# Patient Record
Sex: FEMALE | Race: WHITE | NOT HISPANIC OR LATINO | Employment: FULL TIME | URBAN - METROPOLITAN AREA
[De-identification: names, ages, dates, MRNs, and addresses within clinical notes are randomized per-mention and may not be internally consistent; named-entity substitution may affect disease eponyms.]

---

## 2017-06-07 ENCOUNTER — THERAPY VISIT (OUTPATIENT)
Dept: CHIROPRACTIC MEDICINE | Facility: CLINIC | Age: 42
End: 2017-06-07
Payer: COMMERCIAL

## 2017-06-07 DIAGNOSIS — G89.29 CHRONIC RIGHT-SIDED LOW BACK PAIN WITHOUT SCIATICA: Primary | ICD-10-CM

## 2017-06-07 DIAGNOSIS — M99.04 SOMATIC DYSFUNCTION OF SACRAL REGION: ICD-10-CM

## 2017-06-07 DIAGNOSIS — R29.3 POOR POSTURE: ICD-10-CM

## 2017-06-07 DIAGNOSIS — M53.3 SACRAL PAIN: ICD-10-CM

## 2017-06-07 DIAGNOSIS — M54.50 CHRONIC RIGHT-SIDED LOW BACK PAIN WITHOUT SCIATICA: Primary | ICD-10-CM

## 2017-06-07 DIAGNOSIS — M54.2 CERVICALGIA: ICD-10-CM

## 2017-06-07 DIAGNOSIS — M99.02 THORACIC SEGMENT DYSFUNCTION: ICD-10-CM

## 2017-06-07 PROCEDURE — 97112 NEUROMUSCULAR REEDUCATION: CPT | Mod: 59 | Performed by: CHIROPRACTOR

## 2017-06-07 PROCEDURE — 99203 OFFICE O/P NEW LOW 30 MIN: CPT | Mod: 25 | Performed by: CHIROPRACTOR

## 2017-06-07 PROCEDURE — 98941 CHIROPRACT MANJ 3-4 REGIONS: CPT | Mod: AT | Performed by: CHIROPRACTOR

## 2017-06-07 NOTE — MR AVS SNAPSHOT
After Visit Summary   6/7/2017    Becca Garcia    MRN: [id number]           Patient Information     Date Of Birth          1975        Visit Information        Provider Department      6/7/2017 2:45 PM Kiera Griffith DC [facility] [place] Chiropractor        Today's Diagnoses     Chronic right-sided low back pain without sciatica    -  1    Sacral pain        Somatic dysfunction of sacral region        Thoracic segment dysfunction        Poor posture        Cervicalgia           Follow-ups after your visit        Your next 10 appointments already scheduled     Jun 14, 2017  3:00 PM CDT   [facility] Chiropractor with Kiera Griffith DC   [facility] [place] Chiropractor ([facility] [place])    [address] [place] [address]  [phone]  [place] [address]   [phone]            Jun 21, 2017  3:00 PM CDT   LA[facility]A Chiropractor with Kiera Griffith DC   [facility] [place] Chiropractor ([facility] [place])    [address] [place] [address]  [phone]  [place] [address]   [phone]              Who to contact     If you have questions or need follow up information about today's clinic visit or your schedule please contact [facility] directly at [phone].  Normal or non-critical lab and imaging results will be communicated to you by [employer], letter or phone within 4 business days after the clinic has received the results. If you do not hear from us within 7 days, please contact the clinic through [employer] or phone. If you have a critical or abnormal lab result, we will notify you by phone as soon as possible.  Submit refill requests through [employer] or call your pharmacy and they will forward the refill request to us. Please allow 3 business days for your refill to be completed.          Additional Information About Your Visit        [employer] Information     [employer] lets you send  "messages to your doctor, view your test results, renew your prescriptions, schedule appointments and more. To sign up, go to [email or web address] . Click on \"Log in\" on the left side of the screen, which will take you to the Welcome page. Then click on \"Sign up Now\" on the right side of the page.     You will be asked to enter the access code listed below, as well as some personal information. Please follow the directions to create your username and password.     Your access code is: [email or web address]  Expires: 2017  2:05 PM     Your access code will  in 90 days. If you need help or a new code, please call your [place] clinic or [phone].        Care EveryWhere ID     This is your Care EveryWhere ID. This could be used by other organizations to access your [place] medical records  [id number]         Blood Pressure from Last 3 Encounters:   16 102/62   09/15/12 120/80   09/15/11 133/79    Weight from Last 3 Encounters:   16 68 kg (150 lb)              We Performed the Following     CHIROPRAC MANIP,SPINAL,3-4 REGIONS     NEUROMUSCULAR RE-EDUCATION     OFFICE/OUTPT VISIT,NIKKO KIDD III        Primary Care Provider Office Phone # Fax #    Jennifer Han [phone]       [facility] [address] [place] [address]        Thank you!     Thank you for choosing [place] [facility]  for your care. Our goal is always to provide you with excellent care. Hearing back from our patients is one way we can continue to improve our services. Please take a few minutes to complete the written survey that you may receive in the mail after your visit with us. Thank you!             Your Updated Medication List - Protect others around you: Learn how to safely use, store and throw away your medicines at www.disposemymeds.org.          This list is accurate as of: 17 11:59 PM.  Always use your most recent med list.                "    Brand Name Dispense Instructions for use    NO ACTIVE MEDICATIONS

## 2017-06-09 PROBLEM — M99.02 THORACIC SEGMENT DYSFUNCTION: Status: ACTIVE | Noted: 2017-06-09

## 2017-06-09 PROBLEM — G89.29 CHRONIC RIGHT-SIDED LOW BACK PAIN WITHOUT SCIATICA: Status: ACTIVE | Noted: 2017-06-09

## 2017-06-09 PROBLEM — M99.04 SOMATIC DYSFUNCTION OF SACRAL REGION: Status: ACTIVE | Noted: 2017-06-09

## 2017-06-09 PROBLEM — M53.3 SACRAL PAIN: Status: ACTIVE | Noted: 2017-06-09

## 2017-06-09 PROBLEM — M54.50 CHRONIC RIGHT-SIDED LOW BACK PAIN WITHOUT SCIATICA: Status: ACTIVE | Noted: 2017-06-09

## 2017-06-09 PROBLEM — R29.3 POOR POSTURE: Status: ACTIVE | Noted: 2017-06-09

## 2017-06-09 NOTE — PROGRESS NOTES
Chiropractic Clinic Visit    PCP: Jennifer Han YULI Garcia is a 42 year old female who is seen  as a self referral presenting with chronic low back pain/R SI pain, B neck pain . Patient reports that the onset was ongoing for several years.  When asked, patient denies:, falling, slipping, bending and reaching or sleeping awkwardly. The pt relates the px to several years of activity however there was no specific incident that could have started the px. She notes B neck and shoulder pain with no HA that she relates to stress, in addition to constnat R sided low back pain. Pain is graded a 2-5/10 on VAS. She reports a constant dull ache at a 1/10 on VAS. Sitting will increase the px.  The pt denies weakness in the extremities or other unusual sx.  Prior to onset, the patient was able to sit for 8 hours. Patient notes that due to symptoms, they can only sit for a short period. Becca Garcia notes   sitting rated at a 5/10 painful, difficult and prior to this incident it was 0/10.        Injury: There was no specific injury associated with this episode    Location of Pain: bilateral cervical and R SI pain  at the following level(s) C2 , C3 , C7 , T1 , T9 , L5 , Sacrum  and PSIS Right   Duration of Pain: over 10 years   Rating of Pain at worst: 5/10  Rating of Pain Currently: 3/10  Symptoms are better with: Nothing  Symptoms are worse with: sitting  Additional Features: none      Other evaluation and/or treatments so far consists of: Nothing/previous Chiropractic with good results however temporary    Health History  as reported by the patient:    How does the patient rate their own health:   Excellent    Current or past medical history:   No red flags identified    Medical allergies  None    Past Traumas/Surgeries  None    Family History  Family History   Problem Relation Age of Onset     Other Cancer Father      Breast Cancer Paternal Aunt      2 paternal aunts.        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    Past Medical History:   Diagnosis Date     Cyst of ovary      Past Surgical History:   Procedure Laterality Date     cryotherapy      for cervical dysplasia       Objective  There were no vitals taken for this visit.    GENERAL APPEARANCE: healthy, alert and no distress   GAIT: NORMAL  SKIN: no suspicious lesions or rashes  NEURO: Normal strength and tone, mentation intact and speech normal  PSYCH:  mentation appears normal and affect normal/bright      Becca was asked to complete the Neck Disability Index, the Oswestry Low Back Disability Index and Katia Start Back screening tool, today in the office. NDI Disability score: 14%; pain severity scale: 5/10., The Oswestry Disability score: 8%. Keel Start Total Score:5 Sub Score: 0       Cervical Spine Exam    Range of Motion:         Full active and passive ROM forward flexion, extension,   Decreased lateral rotation, lateral flexion with pain at end range     Inspection:         No visible deformity        normal lordotic curvature maintained  Poor seated posture, slumped, anterior head carriage    Tender:        upper border of trapezius       B suboccipitals, R levator scapula     Non-Tender:        remainder of cervical spine area    Strength:       C4 (shoulder shrug)  symmetric 5/5       C5 (shoulder abduction) symmetric 5/5       C6 (elbow flexion) symmetric 5/5       C7 (elbow extension) symmetric 5/5       C8 (finger abduction, thumb flexion) symmetric 5/5    Reflexes:        C5 (biceps) symmetric normal       C6 (supinator) symmetric normal       C7 (triceps) symmetric  "normal    Sensation:       grossly intact througout bilateral upper extremities    Special Tests:       positive (+) Spurling  Naun's- positive, VBI- negative and Duncan Staton - negative    Lymphatics:        no edema noted in the upper extremities       Lumbar exam:    Inspection:  \"     no visible deformity in the low back       normal skin\"  Poor seated posture, slumped, anterior head carriage    ROM:       full flexion       limited extension due to pain    Tender:       paraspinal muscles       B QL, R gluteus medius    Non Tender:       remainder of lumbar spine    Strength:       hip flexion 5/5       knee extension 5/5       ankle dorsiflexion 5/5       ankle plantarflexion 5/5       dorsiflexion of the great toe 5/5    Reflexes:       patellar (L3, L4) symmetric normal       achilles tendons (S1) symmetric normal    Sensation:      grossly intact throughout lower extremities    Special tests:  Kemps - Right positive, low back pain and Left negative, SLR - Right negative and Left negative, Gaenslen's - Right negative and Left negative and Fabere - Right positive, hip and low back pain and Left negative    Segmental spinal dysfunction/restrictions found at:C2 , C3 , C7 , T1 , T9 , L5 , Sacrum  and PSIS Right   .    The following soft tissue hypotonicities were observed:Gluteal: right, referred pain: no  Quad lumb: bilateral, referred pain: no  Lev scapulae: ache and dull pain, referred pain: no  Sub-occiput: ache and dull pain, referred pain: no    Trigger points were found in:none     Muscle spasm found in:Gluteal, Levator scapulae, Lumbar erector spine, Sub-occipital and Traps      Radiology:  None     Assessment:    1. Chronic right-sided low back pain without sciatica    2. Sacral pain    3. Somatic dysfunction of sacral region    4. Thoracic segment dysfunction    5. Poor posture    6. Cervicalgia        RX ordered/plan of care  Anticipated outcomes  Possible risks and side effects    After discussing " the risk and benefits of care, patient consented to treatment    Prognosis: Excellent      Patient's condition:  Patient had restrictions pre-manipulation    Treatment effectiveness:  Post manipulation there is better intersegmental movement and Patient claims to feel looser post manipulation      Plan:    Procedures:  Evaluation and Management  92050 Moderate level exam 30 min    CMT:  32218 Chiropractic manipulative treatment 3-4 regions performed   Thoracic: Diversified, T1, T6, Prone  Lumbar: Diversified, L5, Side posture  Pelvis: Drop Table, Sacrum , PSIS Right , Prone    Modalities:  None performed this visit    Therapeutic procedures:  31838: Neurological re-education/proprioception training and proper long term sitting posture: Corrected patient's seated posture when sitting for longer than 20 minutes or seated at the computer related to work duties for over 8 hours per day. Fit patient with Cheryl lumbar support for postural re-training. Showed patient how to place the support correctly when seated and to increase usage by 2-3 hour increments per day until they are able to sit full time without spinal irritation with demonstration. Related improper vs. proper sitting to spinal biomechanics using the spine model with demonstration with the purpose of PREVENTING premature spinal degeneration from cumulative static motion. Demonstrated the increase in load and shearing forces on the spine in addition to the cumulative degenerative effects of axial compression on a spine that is chronically slumped and in an 'unlocked' position vs a 'locked' position. Gave cervical retraction for proper cervical alignment, anterior deep cervical flexor strengthening and proprioception training with demonstration. Per 10 minutes total        Response to Treatment  Reduction in symptoms as reported by patient    Treatment plan and goals:  Goals:  Sitting 2 hours without low back pain    Frequency of care  Duration of care is  estimated to be 4-6 weeks, from the initial treatment.  It is estimated that the patient will need a total of 4-6 visits to resolve this episode.  For the initial therapeutic trial of care, the frequency is recommended at 1 X week, once daily.  A reevaluation would be clinically appropriate in 4-6 visits, to determine progress and further course of care.    In-Office Treatment  Evaluation  Spinal Chiropractic Manipulative Therapy  Postural correction  US   ACP discussion              Recommendations:    Instructions:Use lumbar support at all times when seated     Follow-up:  Return to care in 1 week with US therapy  ACP discussion.       Disclaimer: This note consists of symbols derived from keyboarding, dictation and/or voice recognition software. As a result, there may be errors in the script that have gone undetected. Please consider this when interpreting information found in this chart.

## 2017-06-14 ENCOUNTER — THERAPY VISIT (OUTPATIENT)
Dept: CHIROPRACTIC MEDICINE | Facility: CLINIC | Age: 42
End: 2017-06-14
Payer: COMMERCIAL

## 2017-06-14 DIAGNOSIS — M53.3 SACRAL PAIN: ICD-10-CM

## 2017-06-14 DIAGNOSIS — M99.02 THORACIC SEGMENT DYSFUNCTION: ICD-10-CM

## 2017-06-14 DIAGNOSIS — M54.2 CERVICALGIA: ICD-10-CM

## 2017-06-14 DIAGNOSIS — G89.29 CHRONIC RIGHT-SIDED LOW BACK PAIN WITHOUT SCIATICA: Primary | ICD-10-CM

## 2017-06-14 DIAGNOSIS — M99.04 SOMATIC DYSFUNCTION OF SACRAL REGION: ICD-10-CM

## 2017-06-14 DIAGNOSIS — M54.50 CHRONIC RIGHT-SIDED LOW BACK PAIN WITHOUT SCIATICA: Primary | ICD-10-CM

## 2017-06-14 PROCEDURE — 97110 THERAPEUTIC EXERCISES: CPT | Performed by: CHIROPRACTOR

## 2017-06-14 PROCEDURE — 98941 CHIROPRACT MANJ 3-4 REGIONS: CPT | Mod: AT | Performed by: CHIROPRACTOR

## 2017-06-14 PROCEDURE — 97035 APP MDLTY 1+ULTRASOUND EA 15: CPT | Performed by: CHIROPRACTOR

## 2017-06-14 NOTE — MR AVS SNAPSHOT
"              After Visit Summary   6/14/2017    Becca Garcia    MRN: [id number]           Patient Information     Date Of Birth          1975        Visit Information        Provider Department      6/14/2017 3:00 PM Kiera Griffith DC [facility] [place] Chi[facility]        Today's Diagnoses     Chronic right-sided low back pain without sciatica    -  1    Sacral pain        Somatic dysfunction of sacral region        Thoracic segment dysfunction        Cervicalgia           Follow-ups after your visit        Your next 10 appointments already scheduled     Jun 21, 2017  3:00 PM CDT   [facility] Chiropractor with Kiera Griffith DC   [facility] [place] Chiropractor ([facility] [place])    [address] [place] [address]  [address]  [place] [address] [phone]              Who to contact     If you have questions or need follow up information about today's clinic visit or your schedule please contact [facility] [place] directly at [phone].  Normal or non-critical lab and imaging results will be communicated to you by [employer], letter or phone within 4 business days after the clinic has received the results. If you do not hear from us within 7 days, please contact the clinic through [employer] or phone. If you have a critical or abnormal lab result, we will notify you by phone as soon as possible.  Submit refill requests through [employer] or call your pharmacy and they will forward the refill request to us. Please allow 3 business days for your refill to be completed.          Additional Information About Your Visit        MyChart Information     [employer] lets you send messages to your doctor, view your test results, renew your prescriptions, schedule appointments and more. To sign up, go to [email or web address] . Click on \"Log in\" on the left side of the screen, which will take you to the Welcome page. Then click " "on \"Sign up Now\" on the right side of the page.     You will be asked to enter the access code listed below, as well as some personal information. Please follow the directions to create your username and password.     Your access code is: [email or web address]  Expires: 2017  2:05 PM     Your access code will  in 90 days. If you need help or a new code, please call your [place] clinic or [phone].        Care EveryWhere ID     This is your Care EveryWhere ID. This could be used by other organizations to access your [place] medical records  [id number]         Blood Pressure from Last 3 Encounters:   16 102/62   09/15/12 120/80   09/15/11 133/79    Weight from Last 3 Encounters:   16 68 kg (150 lb)              We Performed the Following     CHIROPRAC MANIP,SPINAL,3-4 REGIONS     THERAPEUTIC EXERCISES     ULTRASOUND THERAPY        Primary Care Provider Office Phone # Fax #    Jennifer Han [phone]       [facility] [address] [place] [address]        Thank you!     Thank you for choosing [facility]  for your care. Our goal is always to provide you with excellent care. Hearing back from our patients is one way we can continue to improve our services. Please take a few minutes to complete the written survey that you may receive in the mail after your visit with us. Thank you!             Your Updated Medication List - Protect others around you: Learn how to safely use, store and throw away your medicines at www.disposemymeds.org.          This list is accurate as of: 17 11:59 PM.  Always use your most recent med list.                   Brand Name Dispense Instructions for use    NO ACTIVE MEDICATIONS            "

## 2017-06-15 PROBLEM — R29.3 POOR POSTURE: Status: RESOLVED | Noted: 2017-06-09 | Resolved: 2017-06-15

## 2017-06-15 NOTE — PROGRESS NOTES
Visit #:  2    Subjective:  Becca Garcia is a 42 year old female who is seen in f/u up for:        Chronic right-sided low back pain without sciatica  Sacral pain  Somatic dysfunction of sacral region  Thoracic segment dysfunction  Poor posture  Cervicalgia.     Since last visit on 6/7/2017,  Becca Garcia reports:    Area of chief complaint:  Cervical, Thoracic and Lumbar :  Symptoms are graded at 0/10. The quality is described as stiff, achey, dull.  Motion has increased, but is still not normal. The pt reports no pain for one week due to correcting her overall posture when seated. She is pleased with her improvement. She notes a slight ache in the neck area and upper back from the postural change however the pain is mild. Overall she reports 90% improvement since initial presentation. The pt denies weakness in the extremities or other unusual sx. Patient feels that they have resolved from the initial issue.     Since last visit the patient feels that they are 90% percent  improved from last visit.       Objective:  The following was observed:    P: pain elicited on palpation, C2, T5. T6. L4. R PSIS  A: static palpation demonstrates intersegmental asymmetry , C2, T5. T6. L4. R PSIS  R: motion palpation notes restricted motion  T: hypertonicity at: Lumbar erector spine, Quad lumb and Sub-occipital right     Segmental spinal dysfunction/restrictions found at:   C2, T5. T6. L4. R PSIS      Assessment:    Diagnoses:      1. Chronic right-sided low back pain without sciatica    2. Sacral pain    3. Somatic dysfunction of sacral region    4. Thoracic segment dysfunction    5. Poor posture    6. Cervicalgia        Patient's condition:  Patient had restrictions pre-manipulation    Treatment effectiveness:  Post manipulation there is better intersegmental movement and Patient claims to feel looser post manipulation      Procedures:  CMT:  60538 Chiropractic manipulative treatment 3-4 regions performed   Cervical:  Diversified, C2, C3 , C7 , Supine  Thoracic: Diversified, T1, T6, T7, Prone  Lumbar: Diversified, L5, Side posture  Pelvis: Drop Table, Sacrum , PSIS Right , Prone    Modalities:  40507: US:  1.6 Cortez/cm squared for 8 minutes at 1 mhz   Location: R QL     Therapeutic procedures:  Gave lifting and bending and squatting techniques with demonstration  Gave seated internal/external hip rotation with demonstration  Gave supine pretzel stretch with demonstration  Gave standing external hip rotation with demonstration  By Claribel REESE    Response to Treatment  Reduction in symptoms as reported by patient    Prognosis: Excellent    Progress towards Goals: Patient has met the goal.     Recommendations:    Instructions:continue with postural correction and stretching    Follow-up:  Patient has met the goals of treament.  Discharge from care.

## 2017-08-30 ENCOUNTER — OFFICE VISIT (OUTPATIENT)
Dept: PEDIATRICS | Facility: CLINIC | Age: 42
End: 2017-08-30
Payer: COMMERCIAL

## 2017-08-30 VITALS
OXYGEN SATURATION: 96 % | BODY MASS INDEX: 27.71 KG/M2 | TEMPERATURE: 98.1 F | HEIGHT: 63 IN | WEIGHT: 156.4 LBS | DIASTOLIC BLOOD PRESSURE: 62 MMHG | SYSTOLIC BLOOD PRESSURE: 116 MMHG | HEART RATE: 78 BPM

## 2017-08-30 DIAGNOSIS — R22.1 THROAT SWELLING: ICD-10-CM

## 2017-08-30 DIAGNOSIS — R53.83 FATIGUE, UNSPECIFIED TYPE: Primary | ICD-10-CM

## 2017-08-30 LAB
ERYTHROCYTE [DISTWIDTH] IN BLOOD BY AUTOMATED COUNT: 12.5 % (ref 10–15)
HCT VFR BLD AUTO: 40.4 % (ref 35–47)
HGB BLD-MCNC: 13.5 G/DL (ref 11.7–15.7)
MCH RBC QN AUTO: 30.8 PG (ref 26.5–33)
MCHC RBC AUTO-ENTMCNC: 33.4 G/DL (ref 31.5–36.5)
MCV RBC AUTO: 92 FL (ref 78–100)
PLATELET # BLD AUTO: 326 10E9/L (ref 150–450)
RBC # BLD AUTO: 4.39 10E12/L (ref 3.8–5.2)
WBC # BLD AUTO: 8.7 10E9/L (ref 4–11)

## 2017-08-30 PROCEDURE — 99214 OFFICE O/P EST MOD 30 MIN: CPT | Performed by: INTERNAL MEDICINE

## 2017-08-30 PROCEDURE — 84443 ASSAY THYROID STIM HORMONE: CPT | Performed by: INTERNAL MEDICINE

## 2017-08-30 PROCEDURE — 36415 COLL VENOUS BLD VENIPUNCTURE: CPT | Performed by: INTERNAL MEDICINE

## 2017-08-30 PROCEDURE — 85027 COMPLETE CBC AUTOMATED: CPT | Performed by: INTERNAL MEDICINE

## 2017-08-30 NOTE — MR AVS SNAPSHOT
"              After Visit Summary   8/30/2017    Becca Garcia    MRN: [id number]           Patient Information     Date Of Birth          1975        Visit Information        Provider Department      8/30/2017 1:40 PM Daija Razo MD [facility]        Today's Diagnoses     Fatigue, unspecified type    -  1    Throat swelling          Care Instructions    1. Labs today: thyroid function and blood counts  2. Schedule ultrasound of neck   3. If both are normal, would recommend see ENT for evaluation           Follow-ups after your visit        Future tests that were ordered for you today     Open Future Orders        Priority Expected Expires Ordered    US Head Neck Soft Tissue Routine  8/30/2018 8/30/2017            Who to contact     If you have questions or need follow up information about today's clinic visit or your schedule please contact [facility] directly at [phone].  Normal or non-critical lab and imaging results will be communicated to you by [employer], letter or phone within 4 business days after the clinic has received the results. If you do not hear from us within 7 days, please contact the clinic through MyChart or phone. If you have a critical or abnormal lab result, we will notify you by phone as soon as possible.  Submit refill requests through [employer] or call your pharmacy and they will forward the refill request to us. Please allow 3 business days for your refill to be completed.          Additional Information About Your Visit        [employer] Information     [employer] lets you send messages to your doctor, view your test results, renew your prescriptions, schedule appointments and more. To sign up, go to [email or web address] . Click on \"Log in\" on the left side of the screen, which will take you to the Welcome page. Then click on \"Sign up Now\" on the right side of the page.     You will be asked to enter the access code listed below, as well as some " "personal information. Please follow the directions to create your username and password.     Your access code is: [email or web address]  Expires: 2017  2:05 PM     Your access code will  in 90 days. If you need help or a new code, please call your [place] clinic or [phone].        Care EveryWhere ID     This is your Care EveryWhere ID. This could be used by other organizations to access your [place] medical records  [id number]        Your Vitals Were     Pulse Temperature Height Last Period Pulse Oximetry Breastfeeding?    78 98.1  F (36.7  C) (Tympanic) 5' 3\" (1.6 m) 2017 96% No    BMI (Body Mass Index)                   27.71 kg/m2            Blood Pressure from Last 3 Encounters:   17 116/62   16 102/62   09/15/12 120/80    Weight from Last 3 Encounters:   17 156 lb 6.4 oz (70.9 kg)   16 150 lb (68 kg)              We Performed the Following     CBC with platelets     TSH with free T4 reflex        Primary Care Provider Office Phone # Fax #    Jennifer Han [phone] [address] [place] [address]        Equal Access to Services     [facility]: Hadii aad ku hadasho Soomaali, waaxda luqadaha, qaybta kaalmada adeegyada, waxay yazminin srinivasa patton . So [facility] [phone].    ATENCIÓN: Si habla español, tiene a siddiqui disposición servicios gratuitos de asistencia lingüística. Llame al [phone].    We comply with applicable federal civil rights laws and [place] laws. We do not discriminate on the basis of race, color, national origin, age, disability sex, sexual orientation or gender identity.            Thank you!     Thank you for choosing [facility]  for your care. Our goal is always to provide you with excellent care. Hearing back from our patients is one way we can continue to improve our services. Please take a few minutes to complete the written survey that you may receive in the mail after your visit " with us. Thank you!             Your Updated Medication List - Protect others around you: Learn how to safely use, store and throw away your medicines at www.disposemymeds.org.          This list is accurate as of: 8/30/17  2:26 PM.  Always use your most recent med list.                   Brand Name Dispense Instructions for use Diagnosis    NO ACTIVE MEDICATIONS

## 2017-08-30 NOTE — NURSING NOTE
"Chief Complaint   Patient presents with     Fatigue     Throat Problem       Initial /62 (BP Location: Right arm, Patient Position: Sitting, Cuff Size: Adult Regular)  Pulse 78  Temp 98.1  F (36.7  C) (Tympanic)  Ht 5' 3\" (1.6 m)  Wt 156 lb 6.4 oz (70.9 kg)  LMP 08/21/2017  SpO2 96%  Breastfeeding? No  BMI 27.71 kg/m2 Estimated body mass index is 27.71 kg/(m^2) as calculated from the following:    Height as of this encounter: 5' 3\" (1.6 m).    Weight as of this encounter: 156 lb 6.4 oz (70.9 kg).  Medication Reconciliation: kristin Meadows      "

## 2017-08-30 NOTE — PATIENT INSTRUCTIONS
1. Labs today: thyroid function and blood counts  2. Schedule ultrasound of neck   3. If both are normal, would recommend see ENT for evaluation

## 2017-08-30 NOTE — PROGRESS NOTES
"  SUBJECTIVE:   Becca Garcia is a 42 year old female who presents to clinic today for the following health issues:      Fatigue, lump in throat      Duration: Since July     Description (location/character/radiation): Throat feels swollen, \"like there's something in my throat\",     Intensity:  Mild - moderate    Accompanying signs and symptoms: cold feeling, \"healing from injury taking longer\"     History (similar episodes/previous evaluation): None    Precipitating or alleviating factors: None    Therapies tried and outcome: None    Started over a month ago. Feels like something swollen or stuck in throat and has to swallow around it. No pain in throat. No recent illnesses. Feels very tired - worse over the last month. Feels more tired than usual. Previously was getting 6 hours of sleep a night, now getting more like 7-8 hours a night. Feels rested when wakes up, but tires more easily during the day. No allergies or post-nasal drip. No family history of thyroid problems.     Reviewed and updated as needed this visit by clinical staffTobacco  Allergies  Meds  Problems  Med Hx  Surg Hx  Fam Hx  Soc Hx        Reviewed and updated as needed this visit by Provider  Allergies  Meds  Problems       -------------------------------------    Problem list and histories reviewed & adjusted, as indicated.  Additional history: as documented    ROS:  Constitutional, HEENT, cardiovascular, pulmonary, gi and gu systems are negative, except as otherwise noted.      Problem list, Medication list, Allergies, and Medical/Social/Surgical histories reviewed in [facility] and updated as appropriate.    OBJECTIVE:                                                    /62 (BP Location: Right arm, Patient Position: Sitting, Cuff Size: Adult Regular)  Pulse 78  Temp 98.1  F (36.7  C) (Tympanic)  Ht 5' 3\" (1.6 m)  Wt 156 lb 6.4 oz (70.9 kg)  LMP 08/21/2017  SpO2 96%  Breastfeeding? No  BMI 27.71 kg/m2   Body mass index is " 27.71 kg/(m^2).  General Appearance: healthy, alert and no distress  Eyes:   no discharge, erythema.  Normal pupils.  Oropharynx: Normal mucosa, pharynx, teeth  Neck: Supple.  No adenopathy, no asymmetry, masses, or scars and thyroid normal to palpation  Respiratory: lungs clear to auscultation - no rales, rhonchi or wheezes.  Cardiovascular: regular rate and rhythm, normal S1 S2, no S3 or S4 and no murmur, click or rub.  No peripheral edema.  Skin: no rashes or lesions.  Well perfused and normal turgor.    Diagnostic Test Results:  Results for orders placed or performed in visit on 08/30/17 (from the past 24 hour(s))   CBC with platelets   Result Value Ref Range    WBC 8.7 4.0 - 11.0 10e9/L    RBC Count 4.39 3.8 - 5.2 10e12/L    Hemoglobin 13.5 11.7 - 15.7 g/dL    Hematocrit 40.4 35.0 - 47.0 %    MCV 92 78 - 100 fl    MCH 30.8 26.5 - 33.0 pg    MCHC 33.4 31.5 - 36.5 g/dL    RDW 12.5 10.0 - 15.0 %    Platelet Count 326 150 - 450 10e9/L        ASSESSMENT/PLAN:                                                      (R53.83) Fatigue, unspecified type  (primary encounter diagnosis)  Comment: Hgb normal. TSH pending  Plan: TSH with free T4 reflex, CBC with platelets    (R22.1) Throat swelling  Comment: swollen feeling in throat at level of thyroid. Thyroid not enlarged by exam and nodules palpated, but often cannot feel deep nodules  Plan:   - will obtain thyroid US to eval further  - if US and labs are negative, would recommend consultation with ENT    Follow up with Provider - as needed     [facility]

## 2017-08-31 LAB — TSH SERPL DL<=0.005 MIU/L-ACNC: 1.17 MU/L (ref 0.4–4)

## 2017-09-06 ENCOUNTER — PRE VISIT (OUTPATIENT)
Dept: OTOLARYNGOLOGY | Facility: CLINIC | Age: 42
End: 2017-09-06

## 2017-09-06 NOTE — TELEPHONE ENCOUNTER
1.  Date/reason for appt: 9/19/17 -- globus sensation    2.  Referring provider:  Daija Razo    3.  Call to patient (Yes / No - short description): no, referred    4.  Previous care at / records requested from: [facility] -- records and imaging in epic/pacs                 US Thyroid done 8/30/17

## 2017-09-19 ENCOUNTER — OFFICE VISIT (OUTPATIENT)
Dept: OTOLARYNGOLOGY | Facility: CLINIC | Age: 42
End: 2017-09-19

## 2017-09-19 DIAGNOSIS — R49.0 DYSPHONIA: Primary | ICD-10-CM

## 2017-09-19 DIAGNOSIS — R09.A2 GLOBUS SENSATION: ICD-10-CM

## 2017-09-19 ASSESSMENT — PAIN SCALES - GENERAL: PAINLEVEL: NO PAIN (0)

## 2017-09-19 NOTE — MR AVS SNAPSHOT
After Visit Summary   9/19/2017    Becca Garcia    MRN: [id number]           Patient Information     Date Of Birth          1975        Visit Information        Provider Department      9/19/2017 8:15 AM Mateus Doll MD [facility]ose [facility] Th[facility]        Today's Diagnoses     Dysphonia    -  1    Globus sensation          Care Instructions    GERD (Gastro Esophageal Reflux Disorder)  Other names    reflux     Laryngopharyngeal Reflux Disorder (LPRD)   Symptoms    dry, choking sensation, especially during the night     voice quality that is worst in the morning     raw, burning sensation in the throat     pain in throat, neck, or running from back of chin along neck     frequent coughing or desire to clear throat     rough, gritty voice quality     decline in voice quality or comfort with continued voice use     a sour taste in your mouth upon waking up       Interestingly, the majority of the patients in the [facility] who have laryngeal symptoms of GERD do not have any stomach discomfort or sensation of heartburn.   Cause  Acid from the stomach refluxes back up through the esophagus and spills over onto the larynx. This irritates the vocal folds (also called vocal cords; see the explanation of this terminology) and creates inflammation, which causes the vocal folds to vibrate unevenly. Coughing and throat clearing from the irritation can make the inflammation worse. The resulting voice disorder is often related to the poor vibratory quality of the inflamed vocal folds and the muscle tension created by effortful attempts at compensation.  Treatment  Anti-reflux medication and dietary precautions are the first line of defense. Functional voice therapy is useful to teach techniques for reducing effortful compensation and instruct the individual in improved vocal hygiene.  At the [facility], we do not hand out a list of foods and beverages that must be avoided.  Rather, we educate about types of foods and beverages known to cause reflux and encourage the individual to systematically investigate which foods stimulate their own reflux. Also, the individual is encouraged to manage reflux under the care of a Gastroenterologist (gastrointestinal specialist).  Interested readers are encouraged to look at the website of the [facility] at [facility] for a more in depth discussion of GERD and the voice (see our Links page).  Lifestyle changes that may help reduce symptoms of GERD/LPRD    eat smaller meals more frequently throughout the day, rather than three large meals     elevate the head of your bed 2-3 inches (don't just use extra pillows for your head)     avoid clothes that fit tightly around the waist     avoid lying down within 2-3 hours of eating (don't eat dinner late at night)   Types of foods known to trigger increased stomach acid    spicy food (such as chili or jalapeño peppers, Angolan or Szechuan spices)     acidic foods such as tomato products or citrus products     greasy foods     caffeine     alcohol     carbonation     roughage, such as popcorn and peanuts, or raw vegetables     dairy products     strong mint such as peppermint candies     chocolate     Reading this list might make you think you can only eat bread and oatmeal for the rest of your life. Fortunately, most individuals are not triggered by everything on this list. For example, for every person who is lactose intolerant and has problems with dairy products, there may be someone else whose digestive system is calmed by milk. Also, in our experience, few persons are triggered by peppermints or chocolate. Therefore, we recommend that you experiment with your own dietary habits, changing one food class at a time. Also, if you have recently started taking anti-reflux medications, you may want to wait for several months, to see how the medication works without any change in your  dietary habits.      Plan of care:  Follow up with Dr Doll as needed  Clinic contact information:  1. To schedule an appointment call [phone], option 1  2. To talk to the Triage RN call [phone], option 3  3. If you need to speak to Giovana or get a message to your doctor on a Friday, call the triage RN  4. GiovanaRN: [phone]  5. Surgery scheduling:      Josefina Claire: [phone]      Ruby Ha: [phone]  6. Fax: [phone]  7. [phone]            Follow-ups after your visit        Follow-up notes from your care team     Return if symptoms worsen or fail to improve.      Who to contact     Please call your clinic at [phone] to:    Ask questions about your health    Make or cancel appointments    Discuss your medicines    Learn about your test results    Speak to your doctor   If you have compliments or concerns about an experience at your clinic, or if you wish to file a complaint, please contact [facility] Patient Relations at [phone] or email us at [email or web address]         Additional Information About Your Visit        [employer] Information     [employer] gives you secure access to your electronic health record. If you see a primary care provider, you can also send messages to your care team and make appointments. If you have questions, please call your primary care clinic.  If you do not have a primary care provider, please call [phone] and they will assist you.      [employer] is an electronic gateway that provides easy, online access to your medical records. With [employer], you can request a clinic appointment, read your test results, renew a prescription or communicate with your care team.     To access your existing account, please contact your [facility] or call [phone] for assistance.        Care EveryWhere ID     This is your Care EveryWhere ID. This could be used by other organizations to  access your [place] medical records  [id number]        Your Vitals Were     Last Period                   08/21/2017            Blood Pressure from Last 3 Encounters:   08/30/17 116/62   05/11/16 102/62   09/15/12 120/80    Weight from Last 3 Encounters:   08/30/17 70.9 kg (156 lb 6.4 oz)   05/11/16 68 kg (150 lb)              We Performed the Following     IMAGESTREAM RECORDING ORDER     LARYNGOSCOPY FLEX FIBEROPTIC, DIAGNOSTIC        Primary Care Provider Office Phone # Fax #    Jennifer Han [phone] [address] [place] [address]        Equal Access to Services     [facility]: Hadii aad ku hadasho Soomaali, waaxda luqadaha, qaybta kaalmada adeegyada, waxdenisa arcein hayarvinn tanvir patton . So [facility] [phone].    ATENCIÓN: Si habla español, tiene a siddiqui disposición servicios gratuitos de asistencia lingüística. [facility] [phone].    We comply with applicable federal civil rights laws and [place] laws. We do not discriminate on the basis of race, color, national origin, age, disability sex, sexual orientation or gender identity.            Thank you!     Thank you for choosing [facility] NOSE [facility] TH[facility]  for your care. Our goal is always to provide you with excellent care. Hearing back from our patients is one way we can continue to improve our services. Please take a few minutes to complete the written survey that you may receive in the mail after your visit with us. Thank you!             Your Updated Medication List - Protect others around you: Learn how to safely use, store and throw away your medicines at www.disposemymeds.org.          This list is accurate as of: 9/19/17 11:59 PM.  Always use your most recent med list.                   Brand Name Dispense Instructions for use Diagnosis    NO ACTIVE MEDICATIONS

## 2017-09-19 NOTE — PROGRESS NOTES
HISTORY OF PRESENT ILLNESS: Becca Garcia is a 42 year old female with a history of a lump in the throat sensation since July 26, 2017. This was the day before she was going to visit a friend with a serious disease. She'll resist as she didn't want to be sick at that time. She notes since then she has had a continuous sensation of a lump in the throat. She tends to be cold most of the time and had a thyroid ultrasound and laboratory evaluation normal. She has no swallowing difficulties no airway difficulties. She does have vocal fatigue. She has no neck pain but a constant sensation of tightness in her lower throat. She notes that today she has vocal fatigue. She is quite active both at work and at home.    Last 2 Scores for Patient-Answered VHI Questionnaire  VHI Total Score 9/17/2017   VHI Total Score 0       Last 2 Scores for Patient-Answered CSI Questionnaire  CSI Total Score 9/17/2017   CSI Total Score 0         Last 2 Scores for Patient-Answered EAT Questionnaire  EAT Total Score 9/17/2017   EAT Total Score 0           PAST MEDICAL HISTORY:   Past Medical History:   Diagnosis Date     Cyst of ovary        PAST SURGICAL HISTORY:   Past Surgical History:   Procedure Laterality Date     cryotherapy      for cervical dysplasia       FAMILY HISTORY:   Family History   Problem Relation Age of Onset     Other Cancer Father      Hairy Cell Leukemia     HEART DISEASE Father      MI s/p stent     Breast Cancer Paternal Aunt      2 paternal aunts.        SOCIAL HISTORY:   Social History   Substance Use Topics     Smoking status: Never Smoker     Smokeless tobacco: Never Used     Alcohol use 0.0 oz/week     0 Standard drinks or equivalent per week      Comment: socially       REVIEW OF SYSTEMS: Ten point review of systems was performed and is negative except for:   [facility] E[facility] ROS 9/7/2017   Constitutional Unexplained fatigue   Ears, Nose, Throat Trouble swallowing   Musculoskeletal Back pain        ALLERGIES: Review of  patient's allergies indicates no known allergies.    MEDICATIONS:   Current Outpatient Prescriptions   Medication Sig Dispense Refill     NO ACTIVE MEDICATIONS             PHYSICAL EXAMINATION:  She  is awake, alert and in no apparent distress.    Her tympanic membranes are clear and intact bilaterally. External auditory canals are clear.  Nasal exam shows a mild septal deviation without obstruction.  Examination of the oral cavity shows no suspicious lesions.  There is symmetric movement of the tongue and soft palate.    The oropharynx is clear.  Her neck is supple without significant adenopathy. There is mild tenderness to palpation thyromegaly no muscle. Palpation at the level of the suprasternal notch in the midline also has mild pressure sensation without pain. This is at the proximal level of the  cricopharyngeus muscle. Pulse is regular.  Upper airway is clear.  Cranial nerves II-XII are grossly intact.       PROCEDURE: A flexible laryngoscopy  was performed.  Informed consent was obtained and a time out was performed. 3% lidocaine and 0.25% phenylephrine was sprayed into the nasal cavity and allowed 3 to 5 minutes for effect. The scope was passed through the right sided nostril. Examination showed the vocal folds to be mobile and meet in the midline.  No nodules, polyps or ulcerations are seen.  There is minimal to no inflammation or erythema of the supraglottic or glottic larynx.  With phonation there is moderatecontraction of the supraglottic larynx.  The hypopharynx is otherwise clear as is the subglottis.     IMPRESSION: A moderate muscle tension dysphonia is anterior strap muscle pain. And vocal fatigue. She likely has mild gastroesophageal reflux with cricopharyngeus muscle spasm, tightness. She also has a normal laryngeal exam from an anatomic standpoint.      PLAN: I recommended a brief trial of speech therapy for the muscle tension dysphonia. Going to give her an antireflux dietary she dietary and  habitual recommendations. We are not initiate any medical therapy at this time. All questions were answered and I'll have her follow up on a p.r.n. basis.

## 2017-09-19 NOTE — NURSING NOTE
Chief Complaint   Patient presents with     Consult     Globus sensation feels like there is swelling in the throat     Long [place]vin

## 2017-09-19 NOTE — PATIENT INSTRUCTIONS
GERD (Gastro Esophageal Reflux Disorder)  Other names    reflux     Laryngopharyngeal Reflux Disorder (LPRD)   Symptoms    dry, choking sensation, especially during the night     voice quality that is worst in the morning     raw, burning sensation in the throat     pain in throat, neck, or running from back of chin along neck     frequent coughing or desire to clear throat     rough, gritty voice quality     decline in voice quality or comfort with continued voice use     a sour taste in your mouth upon waking up       Interestingly, the majority of the patients in the [facility] who have laryngeal symptoms of GERD do not have any stomach discomfort or sensation of heartburn.   Cause  Acid from the stomach refluxes back up through the esophagus and spills over onto the larynx. This irritates the vocal folds (also called vocal cords; see the explanation of this terminology) and creates inflammation, which causes the vocal folds to vibrate unevenly. Coughing and throat clearing from the irritation can make the inflammation worse. The resulting voice disorder is often related to the poor vibratory quality of the inflamed vocal folds and the muscle tension created by effortful attempts at compensation.  Treatment  Anti-reflux medication and dietary precautions are the first line of defense. Functional voice therapy is useful to teach techniques for reducing effortful compensation and instruct the individual in improved vocal hygiene.  At the [facility], we do not hand out a list of foods and beverages that must be avoided. Rather, we educate about types of foods and beverages known to cause reflux and encourage the individual to systematically investigate which foods stimulate their own reflux. Also, the individual is encouraged to manage reflux under the care of a Gastroenterologist (gastrointestinal specialist).  Interested readers are encouraged to look at the website of the [facility] for a more in depth discussion of GERD and the voice (see our Links page).  Lifestyle changes that may help reduce symptoms of GERD/LPRD    eat smaller meals more frequently throughout the day, rather than three large meals     elevate the head of your bed 2-3 inches (don't just use extra pillows for your head)     avoid clothes that fit tightly around the waist     avoid lying down within 2-3 hours of eating (don't eat dinner late at night)   Types of foods known to trigger increased stomach acid    spicy food (such as chili or jalapeño peppers, Georgian or Szechuan spices)     acidic foods such as tomato products or citrus products     greasy foods     caffeine     alcohol     carbonation     roughage, such as popcorn and peanuts, or raw vegetables     dairy products     strong mint such as peppermint candies     chocolate     Reading this list might make you think you can only eat bread and oatmeal for the rest of your life. Fortunately, most individuals are not triggered by everything on this list. For example, for every person who is lactose intolerant and has problems with dairy products, there may be someone else whose digestive system is calmed by milk. Also, in our experience, few persons are triggered by peppermints or chocolate. Therefore, we recommend that you experiment with your own dietary habits, changing one food class at a time. Also, if you have recently started taking anti-reflux medications, you may want to wait for several months, to see how the medication works without any change in your dietary habits.      Plan of care:  Follow up with Dr Doll as needed  Clinic contact information:  1. To schedule an appointment call [phone], option 1  2. To talk to the Triage RN call [phone], option 3  3. If you need to speak to Giovana or get a message to your doctor on a Friday, call the triage RN  4. GiovanaRN: [phone]  5. Surgery scheduling:      Josefina Claire:  [phone]      Ruby Ha: [phone]  6. Fax: [phone]  7. [phone]

## 2017-10-04 ENCOUNTER — OFFICE VISIT (OUTPATIENT)
Dept: OTOLARYNGOLOGY | Facility: CLINIC | Age: 42
End: 2017-10-04

## 2017-10-04 DIAGNOSIS — R49.0 DYSPHONIA: Primary | ICD-10-CM

## 2017-10-04 DIAGNOSIS — R09.A2 GLOBUS SENSATION: ICD-10-CM

## 2017-10-04 NOTE — PROGRESS NOTES
"[facility]  Mateus Doll Jr., M.D., F.A.C.S.  Giana Ricardo M.D., M.P.H.  Rosalind Crockett, Ph.D., CCC/SLP  Minerva Royal M.M. (voice), M.YULI., CCC/SLP  Nam Collado M.M. (voice), MANUJA., [facility]/SLP    Patient: Becca Garcia  Date of Visit: 10/4/2017    CHIEF COMPLAINT: Globus sensation, vocal fatigue    HISTORY  Becca Garcia was seen for initial voice evaluation, and treatment today.  She is seen at the kind referral of Dr. Doll. Please refer to Dr. Doll s dictation for a more complete history and impressions. 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  No changes in vocal demand.    A thyroid ultrasound was WNL and she was referred to ENT     At her visit to Dr. Doll flexible nasal endoscopy was performed with the following summary:  \"No nodules, polyps or ulcerations are seen.  There is minimal to no inflammation or erythema of the supraglottic or glottic larynx.  With phonation there is moderatecontraction of the supraglottic larynx.  The hypopharynx is otherwise clear as is the subglottis\"    Based on these findings in conjunction with perilaryngeal muscle tension / tenderness on palpation speech therapy was recommended as initial management    Since her visit with Dr. Doll she reports that although the severity of symptoms waxes and wanes it never fully subsides    Patient denies significant cough, dyspnea, or dysphagia.     OTHER PERTINENT HISTORY    Otherwise unknown.  Please also refer to Dr. Doll's dictation.   Past Medical History:   Diagnosis Date     Cancer (H) 2015    father- hairy cell leukemia- removed spleen     Cyst of ovary      Heart disease 2009    father- stent placed     Past Surgical History:   Procedure Laterality Date     cryotherapy      " for cervical dysplasia       OBJECTIVE  PATIENT REPORTED MEASURES      PERCEPTUAL EVALUATION (CPT 66539)  POSTURE / TENSION:     jaw    neck and shoulders    BREATHING:     appears within normal limits and adequate     LARYNGEAL PALPATION:     reduced thyrohyoid space    no significant tenderness of the thyrohyoid space    Globus sensation localized above the sternal notch with increase in awareness of symptoms from palpation of the strap muscles below the larynx    VOICE:    Roughness: Mild to moderate Consistent    Breathiness: WNL    Strain: Mild Consistent    Loudness    Conversational speech:  WNL    Pitch:    Conversational speech:  Mildly lowered    Pitch glide: WNL    Resonance:    Conversational speech:  laryngeal pharyngeal resonance    Singing vs. Speech:  Reduced roughness with sustained phonation vs. speech    CAPE-V Overall Severity:  26/100    COUGH/THROAT CLEARING:    Occasional    Dry    THERAPY PROBES: Improvement was elicited with use of forward resonant stimuli, use of clear speech protocol and coordination of respiration and phonation    ASSESSMENT / PLAN  IMPRESSIONS: Ms. Garcia presents today R49.0 (Dysphonia) and F45.8 (Globus Sensation) in the context of an imbalance in function of the intrinsic and extrinsic muscles of the larynx, and dave-laryngeal muscle tension.  This picture is consistent with that described by Dr. Doll during his recent evaluation, and is supported by the laryngeal examination performed that day demonstrating supraglottic hyperfunction.    STIMULABILITY: results of therapy probes during perceptual and laryngeal evaluation demonstrate improvement with use of forward resonant stimuli, use of clear speech protocol and coordination of respiration and phonation  RECOMMENDATIONS:     A course of speech therapy is recommended to optimize vocal technique, promote reduced discomfort, effort and fatigue and help reduce dave-laryngeal muscle tension contributing to both dysphonia  and globus sensation.    She demonstrates a Good prognosis for improvement given adherence to therapeutic recommendations. Therapeutic     Positive indicators: positive response to therapy probes diagnosis is known to respond to treatment    Negative indicators: none    DURATION / FREQUENCY: 3 bi-weekly one-hour sessions with 2 monthly one-hour follow-up sessions    GOALS:  Patient goal:   1. To understand the problem and fix it as much as possible    Short-term goal(s): Within the first 4 sessions, Ms. Garcia:  1. will demonstrate assigned laryngeal massage techniques with 80% accuracy or better with no clinician support  2. will be able to independently list key factors in maintenance of good vocal hygiene with 80% accuracy, and report on their use outside the therapy room.  3. will utilize silent inhalation with good low-respiratory engagement 75% of the time during therapy tasks with minimal clinician support  4. will accurately identify target vs. habitual voice quality during therapy tasks in 4 out of 5 trials with no clinician support  5. will demonstrate the ability to alternate between target and habitual voice quality given clinician cue 75% of the time during therapy tasks    Long-term goal(s): In 6 months, Ms. Garcia will:  1. Report a week of typical vocal activities, in which dysphonia and throat discomfort/globus do not exceed a level of 1 out of 10, 80% of the time   This treatment plan was developed with the patient who agreed with the recommendations.  _________________________________________________________________  THERAPY NOTE (CPT 58938)    SUBJECTIVE / OBJECTIVE:    See above evaluation note for subjective and objective measures.     THERAPEUTIC ACTIVITIES  Today Ms. Garcia participated in the following therapeutic activities:    Counseling and Education:    Asked many questions about the nature of her symptoms, and I answered all of these thoroughly.    Instructed concepts and techniques for  "optimal vocal hygiene including:    Systemic hydration, including strategies for increasing daily water intake    Topical hydration - Gargling, saline nasal irrigation, humidification, steam, guaifenesin    Environmental barriers to healthy voicing - noise, inhaled irritants, room acoustics    Awareness and reduction of phonotraumatic behaviors    Moderating voice use    Substituting non-voice alternative behaviors    Avoiding cough and throat clearing    Exercises to promote optimal respiratory mechanics    I provided explanation of the anatomy and physiology of respiration for speech and singing; she found this to be helpful    She demonstrated excessive upper thoracic engagement during inhalation    Demonstrated difficulty allowing abdominal relaxation for inhalation    Practiced in a forward leaning seated posture to facilitate awareness of low respiratory engagement    With clinician support, patient was able to demonstrate improved abdominal relaxation and engagement on inhalation    Good accuracy with minimal clinician support    Exercises to promote reduced perilaryngeal muscle tension    Four way neck stretch instructed    Modifications for additional extension instructed    Base of tongue stretch instructed    Proper form for each stretch was emphasized, including:    Maintenance of posture for 10 breaths (~20-30 seconds)    Awareness of tension on inhalation with volitional relaxation into the stretch on exhalation    Avoidance of \"forcing\" a posture, only progressing far enough to feel the stretch    She reported awareness of significant tension during all directions of the neck stretch, though none felt as if they directly targeted the area of globus    She noted increased sense of muscular relaxation following completion of the stretches    Manual Laryngeal massage was performed in combination with gentle forward resonant sounds    Significant narrowing of the thyrohyoid space    Thyrohyoid space, and " base of tongue were targeted with gentle circular massage    Gentle lateralization of the larynx, anterior neck massage in the area of globus, and sternocleidomastoid massage was also performed.    Patient was trained to focus on intentional relaxation of jaw and tongue in addition to area of massage during these maneuvers.    Marked elevation and contraction of the thyrohyoid space during phonation / speech    Comfortably quiet forward resonant /m/ on descending glides was utilized throughout massage    Self-massage was instructed and patient was able to demonstrate this with acceptable accuracy    A regimen for home practice was instructed.    I provided handouts of today's therapeutic activities to facilitate practice.    ASSESSMENT/PLAN  PROGRESS TOWARD LONG TERM GOALS:   Minimal at this point, as this is first session, but good learning today    IMPRESSIONS: Ms. Garcia presents today R49.0 (Dysphonia) and F45.8 (Globus Sensation) in the context of an imbalance in function of the intrinsic and extrinsic muscles of the larynx, and dave-laryngeal muscle tension. She reports good understanding of therapeutic recommendations, and was able to demonstrate therapeutic recommendations with adequate accuracy.    PLAN: I will see Ms. Garcia in one week, at which point we will advance manual therapy techniques, and target optimization of vocal technique through introduction of Conversational Training Therapy.       PRIMARY ICD-10 code:  R49.0 (Dysphonia)  SECONDARY ICD-10 code:  F45.8 (Globus Sensation)     TOTAL SERVICE TIME: 60 minutes  EVALUATION OF VOICE AND RESONANCE: (39244): 20 minutes    TREATMENT (52795): 40 minutes  NO CHARGE FACILITY FEE (61024)    Torey Collado M.M., M.A., CCC-SLP  Speech-Language Pathologist  Certificate of Vocology  [phone]

## 2017-10-04 NOTE — LETTER
"10/4/2017       RE: Becca Garcia  [address] [address]     Dear Colleague,    Thank you for referring your patient, Becca Garcia, to the [facility]. Please see a copy of my visit note below.    [facility]  Mateus Doll Jr., M.D., F.A.C.S.  Giana Ricardo M.D., M.P.H.  Rosalind Crockett, Ph.D., CCC/SLP  Minerva Royal M.M. (voice), M.A., CCC/SLP  Nam Collado M.M. (voice), M.A., [facility]SLP    Patient: Becca Garcia  Date of Visit: 10/4/2017    CHIEF COMPLAINT: Globus sensation, vocal fatigue    HISTORY  Becca Garcia was seen for initial voice evaluation, and treatment today.  She is seen at the kind referral of Dr. Doll. Please refer to Dr. Doll s dictation for a more complete history and impressions. 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  No changes in vocal demand.    A thyroid ultrasound was WNL and she was referred to ENT     At her visit to Dr. Doll flexible nasal endoscopy was performed with the following summary:  \"No nodules, polyps or ulcerations are seen.  There is minimal to no inflammation or erythema of the supraglottic or glottic larynx.  With phonation there is moderatecontraction of the supraglottic larynx.  The hypopharynx is otherwise clear as is the subglottis\"    Based on these findings in conjunction with perilaryngeal muscle tension / tenderness on palpation speech therapy was recommended as initial management    Since her visit with Dr. Doll she reports that although the severity of symptoms waxes and wanes it never fully subsides    Patient denies significant cough, dyspnea, or dysphagia.     OTHER PERTINENT HISTORY    Otherwise unknown.  Please also " refer to Dr. Doll's dictation.   Past Medical History:   Diagnosis Date     Cancer (H) 2015    father- hairy cell leukemia- removed spleen     Cyst of ovary      Heart disease 2009    father- stent placed     Past Surgical History:   Procedure Laterality Date     cryotherapy      for cervical dysplasia       OBJECTIVE  PATIENT REPORTED MEASURES      PERCEPTUAL EVALUATION (CPT 91580)  POSTURE / TENSION:     jaw    neck and shoulders    BREATHING:     appears within normal limits and adequate     LARYNGEAL PALPATION:     reduced thyrohyoid space    no significant tenderness of the thyrohyoid space    Globus sensation localized above the sternal notch with increase in awareness of symptoms from palpation of the strap muscles below the larynx    VOICE:    Roughness: Mild to moderate Consistent    Breathiness: WNL    Strain: Mild Consistent    Loudness    Conversational speech:  WNL    Pitch:    Conversational speech:  Mildly lowered    Pitch glide: WNL    Resonance:    Conversational speech:  laryngeal pharyngeal resonance    Singing vs. Speech:  Reduced roughness with sustained phonation vs. speech    CAPE-V Overall Severity:  26/100    COUGH/THROAT CLEARING:    Occasional    Dry    THERAPY PROBES: Improvement was elicited with use of forward resonant stimuli, use of clear speech protocol and coordination of respiration and phonation    ASSESSMENT / PLAN  IMPRESSIONS: Ms. Garcia presents today R49.0 (Dysphonia) and F45.8 (Globus Sensation) in the context of an imbalance in function of the intrinsic and extrinsic muscles of the larynx, and dave-laryngeal muscle tension.  This picture is consistent with that described by Dr. Doll during his recent evaluation, and is supported by the laryngeal examination performed that day demonstrating supraglottic hyperfunction.    STIMULABILITY: results of therapy probes during perceptual and laryngeal evaluation demonstrate improvement with use of forward resonant stimuli, use of  clear speech protocol and coordination of respiration and phonation  RECOMMENDATIONS:     A course of speech therapy is recommended to optimize vocal technique, promote reduced discomfort, effort and fatigue and help reduce dave-laryngeal muscle tension contributing to both dysphonia and globus sensation.    She demonstrates a Good prognosis for improvement given adherence to therapeutic recommendations. Therapeutic     Positive indicators: positive response to therapy probes diagnosis is known to respond to treatment    Negative indicators: none    DURATION / FREQUENCY: 3 bi-weekly one-hour sessions with 2 monthly one-hour follow-up sessions    GOALS:  Patient goal:   1. To understand the problem and fix it as much as possible    Short-term goal(s): Within the first 4 sessions, Ms. Garcia:  1. will demonstrate assigned laryngeal massage techniques with 80% accuracy or better with no clinician support  2. will be able to independently list key factors in maintenance of good vocal hygiene with 80% accuracy, and report on their use outside the therapy room.  3. will utilize silent inhalation with good low-respiratory engagement 75% of the time during therapy tasks with minimal clinician support  4. will accurately identify target vs. habitual voice quality during therapy tasks in 4 out of 5 trials with no clinician support  5. will demonstrate the ability to alternate between target and habitual voice quality given clinician cue 75% of the time during therapy tasks    Long-term goal(s): In 6 months, Ms. Garcia will:  1. Report a week of typical vocal activities, in which dysphonia and throat discomfort/globus do not exceed a level of 1 out of 10, 80% of the time   This treatment plan was developed with the patient who agreed with the recommendations.  _________________________________________________________________  THERAPY NOTE (CPT 78395)    SUBJECTIVE / OBJECTIVE:    See above evaluation note for subjective and  "objective measures.     THERAPEUTIC ACTIVITIES  Today Ms. Garcia participated in the following therapeutic activities:    Counseling and Education:    Asked many questions about the nature of her symptoms, and I answered all of these thoroughly.    Instructed concepts and techniques for optimal vocal hygiene including:    Systemic hydration, including strategies for increasing daily water intake    Topical hydration - Gargling, saline nasal irrigation, humidification, steam, guaifenesin    Environmental barriers to healthy voicing - noise, inhaled irritants, room acoustics    Awareness and reduction of phonotraumatic behaviors    Moderating voice use    Substituting non-voice alternative behaviors    Avoiding cough and throat clearing    Exercises to promote optimal respiratory mechanics    I provided explanation of the anatomy and physiology of respiration for speech and singing; she found this to be helpful    She demonstrated excessive upper thoracic engagement during inhalation    Demonstrated difficulty allowing abdominal relaxation for inhalation    Practiced in a forward leaning seated posture to facilitate awareness of low respiratory engagement    With clinician support, patient was able to demonstrate improved abdominal relaxation and engagement on inhalation    Good accuracy with minimal clinician support    Exercises to promote reduced perilaryngeal muscle tension    Four way neck stretch instructed    Modifications for additional extension instructed    Base of tongue stretch instructed    Proper form for each stretch was emphasized, including:    Maintenance of posture for 10 breaths (~20-30 seconds)    Awareness of tension on inhalation with volitional relaxation into the stretch on exhalation    Avoidance of \"forcing\" a posture, only progressing far enough to feel the stretch    She reported awareness of significant tension during all directions of the neck stretch, though none felt as if they directly " targeted the area of globus    She noted increased sense of muscular relaxation following completion of the stretches    Manual Laryngeal massage was performed in combination with gentle forward resonant sounds    Significant narrowing of the thyrohyoid space    Thyrohyoid space, and base of tongue were targeted with gentle circular massage    Gentle lateralization of the larynx, anterior neck massage in the area of globus, and sternocleidomastoid massage was also performed.    Patient was trained to focus on intentional relaxation of jaw and tongue in addition to area of massage during these maneuvers.    Marked elevation and contraction of the thyrohyoid space during phonation / speech    Comfortably quiet forward resonant /m/ on descending glides was utilized throughout massage    Self-massage was instructed and patient was able to demonstrate this with acceptable accuracy    A regimen for home practice was instructed.    I provided handouts of today's therapeutic activities to facilitate practice.    ASSESSMENT/PLAN  PROGRESS TOWARD LONG TERM GOALS:   Minimal at this point, as this is first session, but good learning today    IMPRESSIONS: Ms. Garcia presents today R49.0 (Dysphonia) and F45.8 (Globus Sensation) in the context of an imbalance in function of the intrinsic and extrinsic muscles of the larynx, and dave-laryngeal muscle tension. She reports good understanding of therapeutic recommendations, and was able to demonstrate therapeutic recommendations with adequate accuracy.    PLAN: I will see Ms. Garcia in one week, at which point we will advance manual therapy techniques, and target optimization of vocal technique through introduction of Conversational Training Therapy.       PRIMARY ICD-10 code:  R49.0 (Dysphonia)  SECONDARY ICD-10 code:  F45.8 (Globus Sensation)     TOTAL SERVICE TIME: 60 minutes  EVALUATION OF VOICE AND RESONANCE: (98872): 20 minutes    TREATMENT (58372): 40 minutes  NO CHARGE FACILITY  [facility] ([id number])    Torey Collado M.M., M.A., CCC-SLP  Speech-Language Pathologist  Certificate of Vocology  [phone]

## 2017-10-04 NOTE — MR AVS SNAPSHOT
After Visit Summary   10/4/2017    Becca Garcia    MRN: [id number]           Patient Information     Date Of Birth          1975        Visit Information        Provider Department      10/4/2017 2:00 PM Nam Collado SLP [facility]        Today's Diagnoses     Dysphonia    -  1    Globus sensation           Follow-ups after your visit        Your next 10 appointments already scheduled     Oct 11, 2017  2:00 PM CDT   (Arrive by 1:45 PM)   Return Visit with [facility] ([facility])    [address] [place] [id number] [phone]              Who to contact     Please call your clinic at [phone] to:    Ask questions about your health    Make or cancel appointments    Discuss your medicines    Learn about your test results    Speak to your doctor   If you have compliments or concerns about an experience at your clinic, or if you wish to file a complaint, please contact [facility] Patient Relations at [phone] or email us at [email or web address]         Additional Information About Your Visit        MyChart Information     [employer] gives you secure access to your electronic health record. If you see a primary care provider, you can also send messages to your care team and make appointments. If you have questions, please call your primary care clinic.  If you do not have a primary care provider, please call [phone] and they will assist you.      [employer] is an electronic gateway that provides easy, online access to your medical records. With [employer], you can request a clinic appointment, read your test results, renew a prescription or communicate with your care team.     To access your existing account, please contact your [facility] or call [phone] for assistance.        Care EveryWhere ID     This is your Care EveryWhere ID. This  could be used by other organizations to access your [place] medical records  [id number]         Blood Pressure from Last 3 Encounters:   08/30/17 116/62   05/11/16 102/62   09/15/12 120/80    Weight from Last 3 Encounters:   08/30/17 70.9 kg (156 lb 6.4 oz)   05/11/16 68 kg (150 lb)              We Performed the Following     C BEHAVIORAL & QUALITATIVE ANALYSIS VOICE AND RESONANCE     SPEECH/HEARING THERAPY, INDIVIDUAL        Primary Care Provider Office Phone # Fax #    Jennifer Han [phone] [address] [place] [address]        Equal Access to Services     [facility]: Hadii aad yury hadasho Sowayne, waaxda luqadaha, qaybta kaalmada adefreddyyada, jasmeet patton . So [facility] [phone].    ATENCIÓN: Si habla español, tiene a siddiqui disposición servicios gratuitos de asistencia lingüística. [facility] [phone].    We comply with applicable federal civil rights laws and [place] laws. We do not discriminate on the basis of race, color, national origin, age, disability, sex, sexual orientation, or gender identity.            Thank you!     Thank you for choosing [facility]  for your care. Our goal is always to provide you with excellent care. Hearing back from our patients is one way we can continue to improve our services. Please take a few minutes to complete the written survey that you may receive in the mail after your visit with us. Thank you!             Your Updated Medication List - Protect others around you: Learn how to safely use, store and throw away your medicines at www.disposemymeds.org.          This list is accurate as of: 10/4/17 11:59 PM.  Always use your most recent med list.                   Brand Name Dispense Instructions for use Diagnosis    NO ACTIVE MEDICATIONS

## 2017-10-11 ENCOUNTER — OFFICE VISIT (OUTPATIENT)
Dept: OTOLARYNGOLOGY | Facility: CLINIC | Age: 42
End: 2017-10-11

## 2017-10-11 DIAGNOSIS — R09.A2 GLOBUS SENSATION: ICD-10-CM

## 2017-10-11 DIAGNOSIS — R49.0 DYSPHONIA: Primary | ICD-10-CM

## 2017-10-11 NOTE — MR AVS SNAPSHOT
After Visit Summary   10/11/2017    Becca Garcia    MRN: [id number]           Patient Information     Date Of Birth          1975        Visit Information        Provider Department      10/11/2017 2:00 PM [facility] Collado [facility]        Today's Diagnoses     Dysphonia    -  1    Globus sensation           Follow-ups after your visit        Who to contact     Please call your clinic at [phone] to:    Ask questions about your health    Make or cancel appointments    Discuss your medicines    Learn about your test results    Speak to your doctor   If you have compliments or concerns about an experience at your clinic, or if you wish to file a complaint, please contact [facility] Patient Relations at [phone] or email us at [email or web address]         Additional Information About Your Visit        MyChart Information     [employer] gives you secure access to your electronic health record. If you see a primary care provider, you can also send messages to your care team and make appointments. If you have questions, please call your primary care clinic.  If you do not have a primary care provider, please call [phone] and they will assist you.      [employer] is an electronic gateway that provides easy, online access to your medical records. With [employer], you can request a clinic appointment, read your test results, renew a prescription or communicate with your care team.     To access your existing account, please contact your [facility] or call [phone] for assistance.        Care EveryWhere ID     This is your Care EveryWhere ID. This could be used by other organizations to access your [place] medical records  [id number]         Blood Pressure from Last 3 Encounters:   08/30/17 116/62   05/11/16 102/62   09/15/12 120/80    Weight from Last 3 Encounters:   08/30/17 70.9 kg (156 lb 6.4 oz)   05/11/16 68 kg  (150 lb)              We Performed the Following     SPEECH/HEARING THERAPY, INDIVIDUAL        Primary Care Provider Office Phone # Fax #    Jennifer Han [phone]       [facility] [place] [address]        Equal Access to Services     ANA BROWN : Hadii carlo ku hadmary louo Soomaali, waaxda luqadaha, qaybta kaalmada adeegyada, jasmeet yazminin hayaan moisesfreddy grissom lavivianharshad . So [facility] [phone].    ATENCIÓN: Si habla español, tiene a siddiqui disposición servicios gratuitos de asistencia lingüística. Llame al [phone].    We comply with applicable federal civil rights laws and [place] laws. We do not discriminate on the basis of race, color, national origin, age, disability, sex, sexual orientation, or gender identity.            Thank you!     Thank you for choosing [facility]  for your care. Our goal is always to provide you with excellent care. Hearing back from our patients is one way we can continue to improve our services. Please take a few minutes to complete the written survey that you may receive in the mail after your visit with us. Thank you!             Your Updated Medication List - Protect others around you: Learn how to safely use, store and throw away your medicines at www.disposemymeds.org.          This list is accurate as of: 10/11/17  3:21 PM.  Always use your most recent med list.                   Brand Name Dispense Instructions for use Diagnosis    NO ACTIVE MEDICATIONS

## 2017-10-11 NOTE — LETTER
"10/11/2017      RE: Becca Garcia  [address] [address]       [facility] VOICE CLINIC  THERAPY NOTE (CPT 98286)    Patient: Becca Garcia  Date of Service: 10/11/2017  Referring physician: Dr. Doll  Impressions from most recent evaluation:  \"Ms. Garcia presents today R49.0 (Dysphonia) and F45.8 (Globus Sensation) in the context of an imbalance in function of the intrinsic and extrinsic muscles of the larynx, and dave-laryngeal muscle tension.  This picture is consistent with that described by Dr. Doll during his recent evaluation, and is supported by the laryngeal examination performed that day demonstrating supraglottic hyperfunction.\"    SUBJECTIVE:  Since her last sesion, Ms. Garcia reports the following:     Overall she reports that symptoms are variable but improving    Successes: more days where it feels less present than normal, gargling daily    Hurdles:  None, still ongoing presence of globus intermittently     OBJECTIVE:  PATIENT REPORTED MEASURES:  Question Session Date    10/11/17       Overall, I did my speech therapy exercises / techniques / strategies: 4       0 = n/a  1 = once or twice since last session  2 = three to five days per week 3 = almost every day  4 = one to two times per day  5 = three or more times per day   Since beginning your treatment, how confident do you feel in your ability to manage your current and future symptoms? 4       1 = Not at all   2 = Very little    3 = Somewhat   4 = Reasonably  5 = Very   Considering the progress since your last appointment, please provide a response to the questions below based on the following options:   0 = N/A   1= Not at all   2 = Very little    3 = Somewhat    4 = Quite a bit     5 = A lot   To what extent has the treatment improved your symptoms? 3       To what extent has the treatment made your voice clearer? 0       To what extent has the treatment made it easier to talk? 0       To what extent has the treatment made " "it easier to sing? 0       To what extent are you able to implement the treatment techniques in your daily life? 4-5       To what extent are the treatment techniques habitual? 3       Since your last session how would you rate the severity of your throat irritation? 1-2       0 - 10 scale in which 10 represents patient s worst symptoms and 0 represents no symptoms at all     THERAPEUTIC ACTIVITIES  Today Ms. Garcia participated in the following therapeutic activities:    Manual Laryngeal massage was performed in combination with gentle forward resonant sounds    Significant reduction of the thyrohyoid space with mild tenderness    greater horns of the hyoid, and base of tongue were targeted with gentle circular massage    Self-massage was instructed and patient was able to demonstrate this with acceptable accuracy  Exercises to promote reduced perilaryngeal muscle tension    Lateral neck stretch adjust to target SCM's more specifically    Patient reported improved targeting of tender areas in neck and associated areas with throat discomfort    Conversational Training Therapy     Clear Speech principles targeted    Patient was able to demonstrate \"clear speech\" with minimal clinician support    Negative practice targeting awareness of forward locus of resonance, through alternation between target voice quality and habitual voice quality    Patient was able to articulate the differences between two voice qualities, ultimately labeling them to promote patient ownership over therapy targets. She referred to them as:    Target voice - Happy Voice    Sounds - more inflection    Feels - more engaged    Habitual voice - Sad Voice    Sounds - monotone, lower pitch, more gravelly    Feels - monotonous, not energetic, sad    She was able to alternate effectively between these two voices with >80% accuracy and minimal clinician support.    Focus on final sounds and syllables to facilitate slower rate and improved " "phonatory/respiratory coordination    Reduced roughness percieved with focus on final sounds    Negative practice alternating between final syllable focus and increased rate/minimal pausing was employed and was facilitating    Resonant Voice Therapy (RVT) exercises to promote forward locus of resonance and optimized pattern of laryngeal adduction    Easy descending glide on /m/ utilized in conjunction with relaxed jaw, tongue, and lightly closed lips to facilitate forward resonant sound    Use of the carrier phrase \"mmhmm\" instructed to promote generalization to everyday speech    Phrase level exercises featuring nasal continuants in more complex phonemic contexts were employed    She demonstrated good accuracy with minimal clincian support      A revised regimen for home practice was instructed.    I provided handouts of today's therapeutic activities to facilitate practice.    ASSESSMENT/PLAN  PROGRESS TOWARD LONG TERM GOALS:   Good progress; please see report above for objective measures    IMPRESSIONS: R49.0 (Dysphonia) and F45.8 (Globus Sensation) in the context of an imbalance in function of the intrinsic and extrinsic muscles of the larynx. Ms. Garcia demonstrated good awareness of patterns of use throughout today's exercises.  A combination of improved airflow continuity, forward locus of resonance, and presence of final sounds and syllables in productions were facilitative for reduced strain and roughness.    PLAN: Ms. Garcia is feeling confident in her ability to continue to manage progress independently, and I am in agreement.  She was encouraged to maintain contact with the clinic and return PRN.       TOTAL SERVICE TIME: 60 minutes  TREATMENT (38199): 60 minutes  NO CHARGE FACILITY FEE (08651)    Torey Collado M.M., M.A., CCC-SLP  Speech-Language Pathologist  Certificate of Vocology  [phone]    "

## 2017-10-11 NOTE — PROGRESS NOTES
"[facility] VOICE CLINIC  THERAPY NOTE (CPT 35206)    Patient: Becca Garcia  Date of Service: 10/11/2017  Referring physician: Dr. Doll  Impressions from most recent evaluation:  \"Ms. Garcia presents today R49.0 (Dysphonia) and F45.8 (Globus Sensation) in the context of an imbalance in function of the intrinsic and extrinsic muscles of the larynx, and dave-laryngeal muscle tension.  This picture is consistent with that described by Dr. Doll during his recent evaluation, and is supported by the laryngeal examination performed that day demonstrating supraglottic hyperfunction.\"    SUBJECTIVE:  Since her last sesion, Ms. Garcia reports the following:     Overall she reports that symptoms are variable but improving    Successes: more days where it feels less present than normal, gargling daily    Hurdles:  None, still ongoing presence of globus intermittently     OBJECTIVE:  PATIENT REPORTED MEASURES:  Question Session Date    10/11/17       Overall, I did my speech therapy exercises / techniques / strategies: 4       0 = n/a  1 = once or twice since last session  2 = three to five days per week 3 = almost every day  4 = one to two times per day  5 = three or more times per day   Since beginning your treatment, how confident do you feel in your ability to manage your current and future symptoms? 4       1 = Not at all   2 = Very little    3 = Somewhat   4 = Reasonably  5 = Very   Considering the progress since your last appointment, please provide a response to the questions below based on the following options:   0 = N/A   1= Not at all   2 = Very little    3 = Somewhat    4 = Quite a bit     5 = A lot   To what extent has the treatment improved your symptoms? 3       To what extent has the treatment made your voice clearer? 0       To what extent has the treatment made it easier to talk? 0       To what extent has the treatment made it easier to sing? 0       To what extent are you able to implement the treatment " "techniques in your daily life? 4-5       To what extent are the treatment techniques habitual? 3       Since your last session how would you rate the severity of your throat irritation? 1-2       0 - 10 scale in which 10 represents patient s worst symptoms and 0 represents no symptoms at all     THERAPEUTIC ACTIVITIES  Today Ms. Garcia participated in the following therapeutic activities:    Manual Laryngeal massage was performed in combination with gentle forward resonant sounds    Significant reduction of the thyrohyoid space with mild tenderness    greater horns of the hyoid, and base of tongue were targeted with gentle circular massage    Self-massage was instructed and patient was able to demonstrate this with acceptable accuracy  Exercises to promote reduced perilaryngeal muscle tension    Lateral neck stretch adjust to target SCM's more specifically    Patient reported improved targeting of tender areas in neck and associated areas with throat discomfort    Conversational Training Therapy     Clear Speech principles targeted    Patient was able to demonstrate \"clear speech\" with minimal clinician support    Negative practice targeting awareness of forward locus of resonance, through alternation between target voice quality and habitual voice quality    Patient was able to articulate the differences between two voice qualities, ultimately labeling them to promote patient ownership over therapy targets. She referred to them as:    Target voice - Happy Voice    Sounds - more inflection    Feels - more engaged    Habitual voice - Sad Voice    Sounds - monotone, lower pitch, more gravelly    Feels - monotonous, not energetic, sad    She was able to alternate effectively between these two voices with >80% accuracy and minimal clinician support.    Focus on final sounds and syllables to facilitate slower rate and improved phonatory/respiratory coordination    Reduced roughness percieved with focus on final " "sounds    Negative practice alternating between final syllable focus and increased rate/minimal pausing was employed and was facilitating    Resonant Voice Therapy (RVT) exercises to promote forward locus of resonance and optimized pattern of laryngeal adduction    Easy descending glide on /m/ utilized in conjunction with relaxed jaw, tongue, and lightly closed lips to facilitate forward resonant sound    Use of the carrier phrase \"mmhmm\" instructed to promote generalization to everyday speech    Phrase level exercises featuring nasal continuants in more complex phonemic contexts were employed    She demonstrated good accuracy with minimal clincian support      A revised regimen for home practice was instructed.    I provided handouts of today's therapeutic activities to facilitate practice.    ASSESSMENT/PLAN  PROGRESS TOWARD LONG TERM GOALS:   Good progress; please see report above for objective measures    IMPRESSIONS: R49.0 (Dysphonia) and F45.8 (Globus Sensation) in the context of an imbalance in function of the intrinsic and extrinsic muscles of the larynx. Ms. Garcia demonstrated good awareness of patterns of use throughout today's exercises.  A combination of improved airflow continuity, forward locus of resonance, and presence of final sounds and syllables in productions were facilitative for reduced strain and roughness.    PLAN: Ms. Garcia is feeling confident in her ability to continue to manage progress independently, and I am in agreement.  She was encouraged to maintain contact with the clinic and return PRN.       TOTAL SERVICE TIME: 60 minutes  TREATMENT (06146): 60 minutes  NO CHARGE FACILITY FEE (23923)    Torey Collado M.M., M.A., CCC-SLP  Speech-Language Pathologist  Certificate of Vocology  [phone]    "

## 2017-12-07 ENCOUNTER — OFFICE VISIT (OUTPATIENT)
Dept: OPTOMETRY | Facility: CLINIC | Age: 42
End: 2017-12-07
Payer: COMMERCIAL

## 2017-12-07 DIAGNOSIS — H52.202 HYPEROPIA OF LEFT EYE WITH ASTIGMATISM: ICD-10-CM

## 2017-12-07 DIAGNOSIS — H40.003 GLAUCOMA SUSPECT, BILATERAL: Primary | ICD-10-CM

## 2017-12-07 DIAGNOSIS — H52.221 REGULAR ASTIGMATISM OF RIGHT EYE: ICD-10-CM

## 2017-12-07 DIAGNOSIS — H52.02 HYPEROPIA OF LEFT EYE WITH ASTIGMATISM: ICD-10-CM

## 2017-12-07 PROCEDURE — 92015 DETERMINE REFRACTIVE STATE: CPT | Performed by: OPTOMETRIST

## 2017-12-07 PROCEDURE — 92004 COMPRE OPH EXAM NEW PT 1/>: CPT | Performed by: OPTOMETRIST

## 2017-12-07 ASSESSMENT — KERATOMETRY
OS_AXISANGLE_DEGREES: 143
OD_K2POWER_DIOPTERS: 45.50
OD_AXISANGLE2_DEGREES: 2
OD_AXISANGLE_DEGREES: 92
OS_AXISANGLE2_DEGREES: 53
OD_K1POWER_DIOPTERS: 44.87
METHOD_AUTO_MANUAL: AUTOMATED
OS_K1POWER_DIOPTERS: 43.50
OS_K2POWER_DIOPTERS: 43.87

## 2017-12-07 ASSESSMENT — TONOMETRY
OS_IOP_MMHG: 12
IOP_METHOD: APPLANATION
OD_IOP_MMHG: 13

## 2017-12-07 ASSESSMENT — REFRACTION_MANIFEST
OD_SPHERE: -2.00
OD_AXIS: 010
OD_SPHERE: -0.75
OD_AXIS: 008
OS_AXIS: 002
METHOD_AUTOREFRACTION: 1
OS_CYLINDER: +2.00
OS_CYLINDER: +0.75
OD_CYLINDER: +1.25
OS_SPHERE: -0.75
OD_CYLINDER: +0.75
OS_SPHERE: -1.25
OS_AXIS: 178

## 2017-12-07 ASSESSMENT — CUP TO DISC RATIO
OS_RATIO: 0.55
OD_RATIO: 0.5

## 2017-12-07 ASSESSMENT — VISUAL ACUITY
METHOD: SNELLEN - LINEAR
OD_SC: 20/20
OS_SC: 20/20
OD_SC+: -2
OS_SC: 20/25
OD_SC: 20/40
OS_SC+: -2

## 2017-12-07 ASSESSMENT — CONF VISUAL FIELD
OS_NORMAL: 1
OD_NORMAL: 1

## 2017-12-07 ASSESSMENT — EXTERNAL EXAM - LEFT EYE: OS_EXAM: NORMAL

## 2017-12-07 ASSESSMENT — EXTERNAL EXAM - RIGHT EYE: OD_EXAM: NORMAL

## 2017-12-07 ASSESSMENT — SLIT LAMP EXAM - LIDS: COMMENTS: THIN STRIP

## 2017-12-07 NOTE — PROGRESS NOTES
Chief Complaint   Patient presents with     COMPREHENSIVE EYE EXAM    lasik plus 2004?    Last Eye Exam: 1yr  Dilated Previously: Yes    What are you currently using to see?  does not use glasses or contacts       Distance Vision Acuity: Satisfied with vision    Near Vision Acuity: Not satisfied     Eye Comfort: good  Do you use eye drops? : No  Occupation or Hobbies: Audiologist  At the  in peds            Medical, surgical and family histories reviewed and updated 12/7/2017.       OBJECTIVE: See Ophthalmology exam    ASSESSMENT:    ICD-10-CM    1. Glaucoma suspect, bilateral H40.003 EYE EXAM (SIMPLE-NONBILLABLE)     OPHTHALMOLOGY ADULT REFERRAL   2. Hyperopia of left eye with astigmatism H52.02 REFRACTION    H52.202 EYE EXAM (SIMPLE-NONBILLABLE)   3. Regular astigmatism of right eye H52.221 REFRACTION     EYE EXAM (SIMPLE-NONBILLABLE)      PLAN:   Single vision distance and near prescription for now  Needs an OCT and 24-2 vf at the   This may have been done in 2012 but can not find baseline    Almaz Addison OD

## 2017-12-07 NOTE — PATIENT INSTRUCTIONS
Last record was at the  in 2012 , I can not find further glaucoma testing there  So I did put in a request for an OCT / visual field to be done at the  call there for an appt   Its been 5 years, so even if it was done then, so would be good to repeat   Your vision changed quite a bit from that exam. L eye is now farsighted and significantly more astigmatic     DRY EYE TREATMENT    I recommend using artificial tears for your dry eye. There are over the counter drops that work well and may be used up to 4x daily. ( systane balance, refresh optive, soothe xp)   If you need more than 4 drops daily, use a preservative free product which come in individual vials which may be used for 24 hours and discarded.     Artificial tears work best as a preventative and not as well after your eyes are starting to bother you.  It may take 4- 6 weeks of using the drops before you notice improvement.  If after that time you are still having problems schedule an appointment for an evaluation and discussion of different treatments.  Dry eyes are a chronic condition and you may have more symptoms at certain times of the year.      Additional recommended treatment:  Warm compresses once to twice daily for 5-10 minutes    Directions for warm soaks  There are few methods for hot compresses. Moisten a washcloth with hot water, or microwave for 10 seconds, being careful to not get the cloth too hot.   Then put the washcloth onto your eyelids for 5 minutes. It will cool quickly so a rice pack or eyemask that can be heated and laid on top of the washcloth will help retain the heat.          Omega 3 fatty acid supplements taken 1-2x daily

## 2017-12-07 NOTE — MR AVS SNAPSHOT
After Visit Summary   12/7/2017    Becca Garcia    MRN: [id number]           Patient Information     Date Of Birth          1975        Visit Information        Provider Department      12/7/2017 11:00 AM Almaz Addison OD [facility] [place]        Today's Diagnoses     Glaucoma suspect, bilateral    -  1    Hyperopia of left eye with astigmatism        Regular astigmatism of right eye          Care Instructions    Last record was at the  in 2012 , I can not find further glaucoma testing there  So put in a request for an OCT / visual field to be done at the   Its been 5 years even if it was done then, so would be good to repeat   Your vision changed quite a bit from that exam. L eye is now farsighted and significantly more astigmatic            Follow-ups after your visit        Additional Services     OPHTHALMOLOGY ADULT REFERRAL       Your provider has referred you to: [facility]: Eye Clinic [facility] [phone]   [email or web address]    Please be aware that coverage of these services is subject to the terms and limitations of your health insurance plan.  Call member services at your health plan with any benefit or coverage questions.      Please bring the following with you to your appointment:    (1) Any X-Rays, CTs or MRIs which have been performed.  Contact the facility where they were done to arrange for  prior to your scheduled appointment.    (2) List of current medications  (3) This referral request   (4) Any documents/labs given to you for this referral                  Follow-up notes from your care team     Return in about 1 year (around 12/7/2018) for Annual Visit.      Your next 10 appointments already scheduled     Dec 21, 2017  7:30 AM CST   Office Visit with Jennifer Han MD   [facility] [place] ([facility] [place])    [address] [place] [address]  [address]  [facility] [address] [phone]           Bring a  current list of meds and any records pertaining to this visit. For Physicals, please bring immunization records and any forms needing to be filled out. Please arrive 10 minutes early to complete paperwork.              Who to contact     If you have questions or need follow up information about today's clinic visit or your schedule please contact [facility] directly at [phone].  Normal or non-critical lab and imaging results will be communicated to you by MyChart, letter or phone within 4 business days after the clinic has received the results. If you do not hear from us within 7 days, please contact the clinic through [employer] or phone. If you have a critical or abnormal lab result, we will notify you by phone as soon as possible.  Submit refill requests through X[employer]ion or call your pharmacy and they will forward the refill request to us. Please allow 3 business days for your refill to be completed.          Additional Information About Your Visit        [employer] Information     X[employer]ion gives you secure access to your electronic health record. If you see a primary care provider, you can also send messages to your care team and make appointments. If you have questions, please call your primary care clinic.  If you do not have a primary care provider, please call [phone] and they will assist you.        Care EveryWhere ID     This is your Care EveryWhere ID. This could be used by other organizations to access your [place] medical records  [id number]         Blood Pressure from Last 3 Encounters:   08/30/17 116/62   05/11/16 102/62   09/15/12 120/80    Weight from Last 3 Encounters:   08/30/17 70.9 kg (156 lb 6.4 oz)   05/11/16 68 kg (150 lb)              We Performed the Following     EYE EXAM (SIMPLE-NONBILLABLE)     OPHTHALMOLOGY ADULT REFERRAL     REFRACTION        Primary Care Provider Office Phone # Fax #    Jennifer Han [phone] [address]  [address] [place] [address]        Equal Access to Services     [facility]: Hadii aad ku hadmary loulara Ramos, waromyda laina, qacierrata maximoaljasmeet lal. So [facility] [phone].    ATENCIÓN: Si habla español, tiene a siddiqui disposición servicios gratuitos de asistencia lingüística. Llame al [phone].    We comply with applicable federal civil rights laws and [place] laws. We do not discriminate on the basis of race, color, national origin, age, disability, sex, sexual orientation, or gender identity.            Thank you!     Thank you for choosing [facility]  for your care. Our goal is always to provide you with excellent care. Hearing back from our patients is one way we can continue to improve our services. Please take a few minutes to complete the written survey that you may receive in the mail after your visit with us. Thank you!             Your Updated Medication List - Protect others around you: Learn how to safely use, store and throw away your medicines at www.disposemymeds.org.          This list is accurate as of: 12/7/17 12:55 PM.  Always use your most recent med list.                   Brand Name Dispense Instructions for use Diagnosis    NO ACTIVE MEDICATIONS

## 2017-12-21 ENCOUNTER — OFFICE VISIT (OUTPATIENT)
Dept: PEDIATRICS | Facility: CLINIC | Age: 42
End: 2017-12-21
Payer: COMMERCIAL

## 2017-12-21 VITALS
HEIGHT: 63 IN | SYSTOLIC BLOOD PRESSURE: 108 MMHG | TEMPERATURE: 97.5 F | HEART RATE: 77 BPM | RESPIRATION RATE: 14 BRPM | WEIGHT: 155 LBS | BODY MASS INDEX: 27.46 KG/M2 | OXYGEN SATURATION: 99 % | DIASTOLIC BLOOD PRESSURE: 62 MMHG

## 2017-12-21 DIAGNOSIS — R09.A2 GLOBUS SENSATION: Primary | ICD-10-CM

## 2017-12-21 PROCEDURE — 99214 OFFICE O/P EST MOD 30 MIN: CPT | Performed by: INTERNAL MEDICINE

## 2017-12-21 ASSESSMENT — ANXIETY QUESTIONNAIRES
3. WORRYING TOO MUCH ABOUT DIFFERENT THINGS: NOT AT ALL
IF YOU CHECKED OFF ANY PROBLEMS ON THIS QUESTIONNAIRE, HOW DIFFICULT HAVE THESE PROBLEMS MADE IT FOR YOU TO DO YOUR WORK, TAKE CARE OF THINGS AT HOME, OR GET ALONG WITH OTHER PEOPLE: NOT DIFFICULT AT ALL
5. BEING SO RESTLESS THAT IT IS HARD TO SIT STILL: NOT AT ALL
1. FEELING NERVOUS, ANXIOUS, OR ON EDGE: NOT AT ALL
2. NOT BEING ABLE TO STOP OR CONTROL WORRYING: NOT AT ALL
7. FEELING AFRAID AS IF SOMETHING AWFUL MIGHT HAPPEN: NOT AT ALL
6. BECOMING EASILY ANNOYED OR IRRITABLE: NOT AT ALL
GAD7 TOTAL SCORE: 0

## 2017-12-21 ASSESSMENT — PATIENT HEALTH QUESTIONNAIRE - PHQ9
SUM OF ALL RESPONSES TO PHQ QUESTIONS 1-9: 0
5. POOR APPETITE OR OVEREATING: NOT AT ALL

## 2017-12-21 NOTE — MR AVS SNAPSHOT
After Visit Summary   12/21/2017    Becca Garcia    MRN: [id number]           Patient Information     Date Of Birth          1975        Visit Information        Provider Department      12/21/2017 7:30 AM Jennifer Han MD [facility]        Today's Diagnoses     Globus sensation    -  1      Care Instructions    I'm not entirely sure what this is. I think the next step would be to get an upper endoscopy to look at the throat through the esophagus to make sure you don't have any reflux, strictures, outpouchings, or evidence of eosinophilic esophagitis. If this is negative, we could consider a CT scan.    In the meantime, stop Zantac and start omeprazole 20mg. Take 30-60 min prior to eating. This would help manage any silent reflux.           Follow-ups after your visit        Additional Services     GASTROENTEROLOGY ADULT REF PROCEDURE ONLY       Last Lab Result: Creatinine (mg/dL)       Date                     Value                 05/22/2008               0.97             ----------  Body mass index is 27.46 kg/(m^2).      Patient will be contacted to schedule procedure.     Please be aware that coverage of these services is subject to the terms and limitations of your health insurance plan.  Call member services at your health plan with any benefit or coverage questions.  Any procedures must be performed at a [place] facility OR coordinated by your clinic's referral office.    Please bring the following with you to your appointment:    (1) Any X-Rays, CTs or MRIs which have been performed.  Contact the facility where they were done to arrange for  prior to your scheduled appointment.    (2) List of current medications   (3) This referral request   (4) Any documents/labs given to you for this referral                  Who to contact     If you have questions or need follow up information about today's clinic visit or your schedule please contact [facility]  "directly at [phone].  Normal or non-critical lab and imaging results will be communicated to you by Smart[employer], letter or phone within 4 business days after the clinic has received the results. If you do not hear from us within 7 days, please contact the clinic through [employer] or phone. If you have a critical or abnormal lab result, we will notify you by phone as soon as possible.  Submit refill requests through NoDaysOff or call your pharmacy and they will forward the refill request to us. Please allow 3 business days for your refill to be completed.          Additional Information About Your Visit        [employer] Information     [employer] gives you secure access to your electronic health record. If you see a primary care provider, you can also send messages to your care team and make appointments. If you have questions, please call your primary care clinic.  If you do not have a primary care provider, please call [phone] and they will assist you.        Care EveryWhere ID     This is your Care EveryWhere ID. This could be used by other organizations to access your [place] medical records  [id number]        Your Vitals Were     Pulse Temperature Respirations Height Pulse Oximetry Breastfeeding?    77 97.5  F (36.4  C) (Oral) 14 5' 3\" (1.6 m) 99% No    BMI (Body Mass Index)                   27.46 kg/m2            Blood Pressure from Last 3 Encounters:   12/21/17 108/62   08/30/17 116/62   05/11/16 102/62    Weight from Last 3 Encounters:   12/21/17 155 lb (70.3 kg)   08/30/17 156 lb 6.4 oz (70.9 kg)   05/11/16 150 lb (68 kg)              We Performed the Following     GASTROENTEROLOGY ADULT REF PROCEDURE ONLY          Today's Medication Changes          These changes are accurate as of: 12/21/17  7:52 AM.  If you have any questions, ask your nurse or doctor.               Start taking these medicines.        Dose/Directions    omeprazole 20 MG CR capsule   Commonly known as:  priLOSEC   Used for:  Globus " sensation   Started by:  Jennifer Han MD        Dose:  20 mg   Take 1 capsule (20 mg) by mouth daily   Quantity:  30 capsule   Refills:  1         Stop taking these medicines if you haven't already. Please contact your care team if you have questions.     NO ACTIVE MEDICATIONS   Stopped by:  Jennifer Han MD                Where to get your medicines      These medications were sent to [facility] [place] [facility] [place] - [address]   [address], [place] [address]     Phone:  [phone]     omeprazole 20 MG CR capsule                Primary Care Provider Office Phone # Fax #    Jennifer Han [phone] [address] [place] [address]        Equal Access to Services     [facility]: Hadii carlo cotton hadasho Soomaali, waaxda luqadaha, qaybta kaalmada adeegyada, waxay yazminin srinivasa patton . So [facility] [phone].    ATENCIÓN: Si habla español, tiene a siddiqui disposición servicios gratuitos de asistencia lingüística. Ll[facility] [phone].    We comply with applicable federal civil rights laws and [place] laws. We do not discriminate on the basis of race, color, national origin, age, disability, sex, sexual orientation, or gender identity.            Thank you!     Thank you for choosing [facility]  for your care. Our goal is always to provide you with excellent care. Hearing back from our patients is one way we can continue to improve our services. Please take a few minutes to complete the written survey that you may receive in the mail after your visit with us. Thank you!             Your Updated Medication List - Protect others around you: Learn how to safely use, store and throw away your medicines at www.disposemymeds.org.          This list is accurate as of: 12/21/17  7:52 AM.  Always use your most recent med list.                   Brand Name Dispense Instructions for use Diagnosis    omeprazole 20 MG  CR capsule    priLOSEC    30 capsule    Take 1 capsule (20 mg) by mouth daily    Globus sensation       ZANTAC 150 MG tablet   Generic drug:  ranitidine      Take 75 mg by mouth daily

## 2017-12-21 NOTE — PATIENT INSTRUCTIONS
I'm not entirely sure what this is. I think the next step would be to get an upper endoscopy to look at the throat through the esophagus to make sure you don't have any reflux, strictures, outpouchings, or evidence of eosinophilic esophagitis. If this is negative, we could consider a CT scan.    In the meantime, stop Zantac and start omeprazole 20mg. Take 30-60 min prior to eating. This would help manage any silent reflux.

## 2017-12-21 NOTE — PROGRESS NOTES
SUBJECTIVE:   Becca Garcia is a 42 year old female who presents to clinic today for the following health issues:    Pain in Throat      Duration: Since July    Description (location/character/radiation): feels like there is a sensation of something stuck in throat - see other documentation from ENT, Ultrasound, Dr. DUKES.     Intensity:  moderate    Accompanying signs and symptoms: no pain, discomfort, just does not go away     History (similar episodes/previous evaluation): Yes, Ultrasound/ENT visits - did go away for approx 2 weeks (non consecutive) since July    Precipitating or alleviating factors: None    Therapies tried and outcome: zantac x10 so far and doesn't know if it's helping, was told she may have silent reflux       Since end of July, has had sensation that there is swelling in her throat around her lower neck. Was seen in clinic in October, thyroid US and labs were nl. Was seen by ENT, everything looked good. Was seen by speech therapy. Did thing that this could be silent reflux, started Zantac and has been taking this now for 10 days. No improvement with Zantac.     Speech therapy worked on how to strengthen voice and use it better. No weight loss, no lumps or bumps that she has noticed. No fevers or chills, fatigue has improved.     Feels globus sensation all the time, doesn't seem to be worse with talking or swallowing, although she notes that perhaps the sensation is more noticeable with swallowing. Food, liquids don't get stuck when she swallows. No coughing.     Problem list and histories reviewed & adjusted, as indicated.  Additional history: as documented    Patient Active Problem List   Diagnosis     Cervicalgia     Nonallopathic lesion of thoracic region     Disorder of bursae and tendons in shoulder region     Chronic right-sided low back pain without sciatica     Sacral pain     Somatic dysfunction of sacral region     Thoracic segment dysfunction     Past Surgical History:   Procedure  "Laterality Date     cryotherapy      for cervical dysplasia     ENHANCE LASER REFRACTIVE BILATERAL EXISTING PT IN PARAMETERS         Social History   Substance Use Topics     Smoking status: Never Smoker     Smokeless tobacco: Never Used     Alcohol use 0.0 oz/week      Comment: 1-2 drinks/week     Family History   Problem Relation Age of Onset     Other Cancer Father      Hairy Cell Leukemia     HEART DISEASE Father      MI s/p stent     Breast Cancer Paternal Aunt      2 paternal aunts.      Macular Degeneration No family hx of      Glaucoma No family hx of              Reviewed and updated as needed this visit by clinical staffTobacco  Allergies  Med Hx  Surg Hx  Fam Hx  Soc Hx        ROS:  Constitutional, HEENT, pulmonary, gi systems are negative, except as otherwise noted.      OBJECTIVE:   /62 (BP Location: Right arm, Patient Position: Chair, Cuff Size: Adult Regular)  Pulse 77  Temp 97.5  F (36.4  C) (Oral)  Resp 14  Ht 5' 3\" (1.6 m)  Wt 155 lb (70.3 kg)  SpO2 99%  Breastfeeding? No  BMI 27.46 kg/m2  Body mass index is 27.46 kg/(m^2).  GENERAL: healthy, alert and no distress  EYES: Eyes grossly normal to inspection, PERRL and conjunctivae and sclerae normal  HENT: ear canals and TM's normal, nose and mouth without ulcers or lesions  NECK: no adenopathy, no asymmetry, masses, or scars and thyroid normal to palpation    Diagnostic Test Results:  none     ASSESSMENT/PLAN:     1. Globus sensation  Unclear etiology. Has had negative ENT work-up; at that time thought to be due either to silent reflux vs overuse of voice (however speech therapy hasn't seemed to help much). At this point will pursue EGD to rule out stricture, Schatke rings, diverticulum, EE, or reflux. In the meantime will switch H2 blocker to PPI. If EGD negative, could purse neck CT, but reviewed with patient that risks of radiation and cost may not be worth benefit if prior work-up is negative.   - omeprazole (PRILOSEC) 20 MG CR " capsule; Take 1 capsule (20 mg) by mouth daily  Dispense: 30 capsule; Refill: 1  - GASTROENTEROLOGY ADULT REF PROCEDURE ONLY    Patient Instructions   I'm not entirely sure what this is. I think the next step would be to get an upper endoscopy to look at the throat through the esophagus to make sure you don't have any reflux, strictures, outpouchings, or evidence of eosinophilic esophagitis. If this is negative, we could consider a CT scan.    In the meantime, stop Zantac and start omeprazole 20mg. Take 30-60 min prior to eating. This would help manage any silent reflux.       Jennifer Li MD  [facility]

## 2017-12-21 NOTE — NURSING NOTE
"Chief Complaint   Patient presents with     Throat Problem       Initial /62 (BP Location: Right arm, Patient Position: Chair, Cuff Size: Adult Regular)  Pulse 77  Temp 97.5  F (36.4  C) (Oral)  Resp 14  Ht 5' 3\" (1.6 m)  Wt 155 lb (70.3 kg)  SpO2 99%  Breastfeeding? No  BMI 27.46 kg/m2 Estimated body mass index is 27.46 kg/(m^2) as calculated from the following:    Height as of this encounter: 5' 3\" (1.6 m).    Weight as of this encounter: 155 lb (70.3 kg).  Medication Reconciliation: complete  Adilia Gardner CMA  "

## 2017-12-22 ASSESSMENT — ANXIETY QUESTIONNAIRES: GAD7 TOTAL SCORE: 0

## 2018-06-19 ENCOUNTER — THERAPY VISIT (OUTPATIENT)
Dept: CHIROPRACTIC MEDICINE | Facility: CLINIC | Age: 43
End: 2018-06-19
Payer: COMMERCIAL

## 2018-06-19 DIAGNOSIS — M54.50 CHRONIC RIGHT-SIDED LOW BACK PAIN WITHOUT SCIATICA: Primary | ICD-10-CM

## 2018-06-19 DIAGNOSIS — G89.29 CHRONIC RIGHT-SIDED LOW BACK PAIN WITHOUT SCIATICA: Primary | ICD-10-CM

## 2018-06-19 DIAGNOSIS — M99.03 SOMATIC DYSFUNCTION OF LUMBAR REGION: ICD-10-CM

## 2018-06-19 DIAGNOSIS — M99.02 THORACIC SEGMENT DYSFUNCTION: ICD-10-CM

## 2018-06-19 DIAGNOSIS — M99.05 SOMATIC DYSFUNCTION OF PELVIS REGION: ICD-10-CM

## 2018-06-19 PROBLEM — M53.3 SACRAL PAIN: Status: RESOLVED | Noted: 2017-06-09 | Resolved: 2018-06-19

## 2018-06-19 PROBLEM — M99.04 SOMATIC DYSFUNCTION OF SACRAL REGION: Status: RESOLVED | Noted: 2017-06-09 | Resolved: 2018-06-19

## 2018-06-19 PROCEDURE — 99213 OFFICE O/P EST LOW 20 MIN: CPT | Mod: 25 | Performed by: CHIROPRACTOR

## 2018-06-19 PROCEDURE — 97035 APP MDLTY 1+ULTRASOUND EA 15: CPT | Performed by: CHIROPRACTOR

## 2018-06-19 PROCEDURE — 98941 CHIROPRACT MANJ 3-4 REGIONS: CPT | Mod: AT | Performed by: CHIROPRACTOR

## 2018-06-19 NOTE — PROGRESS NOTES
Chiropractic Clinic Visit    PCP: Jennifer Han Garcia is a 43 year old female who is seen  as a self referral presenting with chronic low back pain/R SI pain, B lower thoracic spine pain . Patient reports that the onset was ongoing for several years.  Recent exacerbation of these symptoms occurred over the last 2 days. When asked, patient denies:, falling, slipping, bending and reaching or sleeping awkwardly. The pt relates the px to several years of activity however there was no specific incident that could have started the px. Pain is graded a 5/10 on VAS.  Sitting will increase the px.  The pt denies weakness in the extremities or other unusual sx.  Prior to onset, the patient was able to sit for 8 hours. Patient notes that due to symptoms, they can only sit for a 1/2 hour period. Beccashaheed Garcia notes   sitting rated at a 5/10 painful, difficult and prior to this incident it was 0/10.        Injury: There was no specific injury associated with this episode    Location of Pain: bilateral cervical and R SI pain  at the following level(s) T5, T10, L4, L5, PSIS right  Duration of Pain: over 10 years, recent increase in symptoms over the last 2 days  Rating of Pain at worst: 7/10  Rating of Pain Currently: 5/10  Symptoms are better with: Nothing  Symptoms are worse with: sitting  Additional Features: none      Other evaluation and/or treatments so far consists of: Nothing/previous Chiropractic with good results however temporary    Health History  as reported by the patient:    How does the patient rate their own health:   Excellent    Current or past medical history:   No red flags identified    Medical allergies  None    Past Traumas/Surgeries  None    Family History  Family History   Problem Relation Age of Onset     Other Cancer Father      Hairy Cell Leukemia     HEART DISEASE Father      MI s/p stent     Breast Cancer Paternal Aunt      2 paternal aunts.      Macular Degeneration No family hx of   "    Glaucoma No family hx of        Medications:  None    Occupation:  [employer]iolog[employer]    Primary job tasks:   Prolonged sitting, Prolonged standing and Repetitive tasks    Barriers as home/work:   none    Additional health Issues:     None               Review of Systems  Musculoskeletal: as above  Remainder of review of systems is negative including constitutional, CV, pulmonary, GI, Skin and Neurologic except as noted in HPI or medical history.    Past Medical History:   Diagnosis Date     Cancer (H) 2015    father- hairy cell leukemia- removed spleen     Cyst of ovary      Heart disease 2009    father- stent placed     Past Surgical History:   Procedure Laterality Date     cryotherapy      for cervical dysplasia     ENHANCE LASER REFRACTIVE BILATERAL EXISTING PT IN PARAMETERS         Objective  There were no vitals taken for this visit.    GENERAL APPEARANCE: healthy, alert and no distress   GAIT: NORMAL  SKIN: no suspicious lesions or rashes  NEURO: Normal strength and tone, mentation intact and speech normal  PSYCH:  mentation appears normal and affect normal/bright        Lumbar exam:    Inspection:  \"     no visible deformity in the low back       normal skin\"  Poor seated posture, slumped, anterior head carriage    ROM:       full flexion       limited extension due to pain    Tender:       paraspinal muscles       B QL, R gluteus medius    Non Tender:       remainder of lumbar spine    Strength:       hip flexion 5/5       knee extension 5/5       ankle dorsiflexion 5/5       ankle plantarflexion 5/5       dorsiflexion of the great toe 5/5    Reflexes:       patellar (L3, L4) symmetric normal       achilles tendons (S1) symmetric normal    Sensation:      grossly intact throughout lower extremities    Special tests:  Kemps - Right positive, low back pain and Left positive produced LBP, SLR - Right negative and Left negative, Gaenslen's - Right negative and Left negative and Fabere - Right positive, hip and low " back pain and Left negative    Segmental spinal dysfunction/restrictions found at: T5 T9 , L4, L5 , Sacrum  and PSIS Right   .    The following soft tissue hypotonicities were observed:Gluteal: right, referred pain: no  Quad lumb: bilateral, referred pain: no  Lev scapulae: ache and dull pain, referred pain: no  Sub-occiput: ache and dull pain, referred pain: no    Trigger points were found in:none     Muscle spasm found in:Gluteal, Levator scapulae, Lumbar erector spine, Sub-occipital and Traps      Radiology:  None     Assessment:    No diagnosis found.    RX ordered/plan of care  Anticipated outcomes  Possible risks and side effects    After discussing the risk and benefits of care, patient consented to treatment    Prognosis: Excellent      Patient's condition:  Patient had restrictions pre-manipulation    Treatment effectiveness:  Post manipulation there is better intersegmental movement and Patient claims to feel looser post manipulation      Plan:    Procedures:  Evaluation and Management  12899 Moderate level exam 30 min    CMT:  67249 Chiropractic manipulative treatment 3-4 regions performed   Thoracic: Diversified, T5, T9  Lumbar: Diversified,L4,  L5, Side posture  Pelvis: Drop Table, Sacrum , PSIS Right , Prone    Modalities:  U/S to L/S spine for 8 min at 2.0    Therapeutic procedures:  none        Response to Treatment  Reduction in symptoms as reported by patient    Treatment plan and goals:  Goals:  Sitting 2 hours without low back pain, Reduce pain to 0-1/10 VAS from 5/10.     Frequency of care  Duration of care is estimated to be 4-6 weeks, from the initial treatment.  It is estimated that the patient will need a total of 4-6 visits to resolve this episode.  For the initial therapeutic trial of care, the frequency is recommended at 1 X week, once daily.  A reevaluation would be clinically appropriate in 4-6 visits, to determine progress and further course of care.    In-Office  Treatment  Evaluation  65948 Chiropractic manipulative treatment 3-4 regions performed   Thoracic: Diversified, T5, T9  Lumbar: Diversified,L4,  L5, Side posture  Pelvis: Drop Table, Sacrum , PSIS Right , Prone    Modalities:  U/S to L/S spine for 8 min at 2.0        Recommendations:    Instructions:Use lumbar support at all times when seated     Follow-up:  Return to care in 1 week if symptoms persist.      Disclaimer: This note consists of symbols derived from keyboarding, dictation and/or voice recognition software. As a result, there may be errors in the script that have gone undetected. Please consider this when interpreting information found in this chart.

## 2019-10-23 ENCOUNTER — OFFICE VISIT (OUTPATIENT)
Dept: OPTOMETRY | Facility: CLINIC | Age: 44
End: 2019-10-23
Payer: COMMERCIAL

## 2019-10-23 DIAGNOSIS — H52.4 PRESBYOPIA: ICD-10-CM

## 2019-10-23 DIAGNOSIS — H04.129 DRY EYE: ICD-10-CM

## 2019-10-23 DIAGNOSIS — H52.203 ASTIGMATISM OF BOTH EYES, UNSPECIFIED TYPE: Primary | ICD-10-CM

## 2019-10-23 DIAGNOSIS — H40.003 GLAUCOMA SUSPECT, BILATERAL: ICD-10-CM

## 2019-10-23 PROCEDURE — 92015 DETERMINE REFRACTIVE STATE: CPT | Performed by: OPTOMETRIST

## 2019-10-23 PROCEDURE — 92014 COMPRE OPH EXAM EST PT 1/>: CPT | Performed by: OPTOMETRIST

## 2019-10-23 PROCEDURE — 92310 CONTACT LENS FITTING OU: CPT | Performed by: OPTOMETRIST

## 2019-10-23 ASSESSMENT — REFRACTION_MANIFEST
OD_AXIS: 014
OS_CYLINDER: +1.25
OS_ADD: +1.50
OS_SPHERE: -1.50
OD_CYLINDER: +0.50
OS_AXIS: 006
OD_ADD: +1.50
OD_SPHERE: -1.00
OD_SPHERE: -0.75
OS_CYLINDER: +1.25
OS_AXIS: 179
OS_SPHERE: -1.50
METHOD_AUTOREFRACTION: 1

## 2019-10-23 ASSESSMENT — REFRACTION_WEARINGRX
OS_AXIS: 176
OS_SPHERE: -1.00
OD_CYLINDER: +0.75
OS_CYLINDER: +2.00
OS_ADD: +1.25
OD_ADD: +1.25
OD_SPHERE: -0.50
OD_AXIS: 005
SPECS_TYPE: PAL

## 2019-10-23 ASSESSMENT — SLIT LAMP EXAM - LIDS: COMMENTS: THIN STRIP

## 2019-10-23 ASSESSMENT — VISUAL ACUITY
METHOD: SNELLEN - LINEAR
OS_CC: 20/20
OS_CC: 20/20-2
OS_SC: 20/30
CORRECTION_TYPE: GLASSES
OD_CC+: -1
OD_CC: 20/30-1
OD_CC: 20/20
OD_SC: 20/30

## 2019-10-23 ASSESSMENT — EXTERNAL EXAM - LEFT EYE: OS_EXAM: NORMAL

## 2019-10-23 ASSESSMENT — CONF VISUAL FIELD
METHOD: COUNTING FINGERS
OS_NORMAL: 1
OD_NORMAL: 1

## 2019-10-23 ASSESSMENT — TONOMETRY
OS_IOP_MMHG: 13
IOP_METHOD: APPLANATION
OD_IOP_MMHG: 12

## 2019-10-23 ASSESSMENT — EXTERNAL EXAM - RIGHT EYE: OD_EXAM: NORMAL

## 2019-10-23 ASSESSMENT — CUP TO DISC RATIO
OD_RATIO: 0.55
OS_RATIO: 0.5

## 2019-10-23 NOTE — LETTER
10/23/2019         RE: Becca ROLDAN Garcia  [address]  [place] [address]        Dear Colleague,    Thank you for referring your patient, Becca Garcia, to the [facility]. Please see a copy of my visit note below.    Chief Complaint   Patient presents with     Annual Eye Exam        Previous contact lens wearer? Yes: 14 years ago, before LASIK        Last Eye Exam:2017  Dilated Previously: Yes    What are you currently using to see?  glasses    Distance Vision Acuity: Satisfied with vision    Near Vision Acuity: Satisfied with vision while reading and using computer with glasses    Eye Comfort: good  Do you use eye drops? 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     POHX  Bilateral lasik with regression  History of glaucoma suspect   Reviewed 2012 record with cd ration 0.5 each eye  OCT and vf were done and normal     OBJECTIVE: See Ophthalmology exam    ASSESSMENT:    ICD-10-CM    1. Astigmatism of both eyes, unspecified type H52.203    2. Presbyopia H52.4    3. Glaucoma suspect, bilateral H40.003    4. Dry eye H04.129       PLAN:   Recommend  Subsequent OCT and VF at the  for glaucoma   Update prescription   Artificial tears  As needed     Almaz Addison OD     Again, thank you for allowing me to participate in the care of your patient.        Sincerely,        Almaz Addison, OD

## 2019-10-23 NOTE — PATIENT INSTRUCTIONS
DRY EYE TREATMENT    I recommend using artificial tears for your dry eye. There are over the counter drops that work well and may be used up to 4x daily. ( systane balance, refresh optive, soothe xp)   If you need more than 4 drops daily, use a preservative free product which come in individual vials which may be used for 24 hours and discarded.     Artificial tears work best as a preventative and not as well after your eyes are starting to bother you.  It may take 4- 6 weeks of using the drops before you notice improvement.  If after that time you are still having problems schedule an appointment for an evaluation and discussion of different treatments.  Dry eyes are a chronic condition and you may have more symptoms at certain times of the year.      Additional recommended treatment:  Warm compresses once to twice daily for 5-10 minutes    Directions for warm soaks  There are few methods for hot compresses. Moisten a washcloth with hot water, or microwave for 10 seconds, being careful to not get the cloth too hot.   Then put the washcloth onto your eyelids for 5 minutes. It will cool quickly so a rice pack or eyemask that can be heated and laid on top of the washcloth will help retain the heat.          Omega 3 fatty acid supplements taken 1-2x daily  Recommend  at least  2000mg omega 3  800 EPA  600 DHA    Blink regularly  Stay hydrated  Increase humidity  Wear sunglasses   Avoid direct exposure to forced air--turn air vents in the car away and keep fans from blowing directly on your face      Prescription change both eyes    OCT and vf for glaucoma testing

## 2019-10-23 NOTE — PROGRESS NOTES
Chief Complaint   Patient presents with     Annual Eye Exam        Previous contact lens wearer? Yes: 14 years ago, before LASIK        Last Eye Exam:2017  Dilated Previously: Yes    What are you currently using to see?  glasses    Distance Vision Acuity: Satisfied with vision    Near Vision Acuity: Satisfied with vision while reading and using computer with glasses    Eye Comfort: good  Do you use eye drops? 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     POHX  Bilateral lasik with regression  History of glaucoma suspect   Reviewed 2012 record with cd ration 0.5 each eye  OCT and vf were done and normal     OBJECTIVE: See Ophthalmology exam    ASSESSMENT:    ICD-10-CM    1. Astigmatism of both eyes, unspecified type H52.203    2. Presbyopia H52.4    3. Glaucoma suspect, bilateral H40.003    4. Dry eye H04.129       PLAN:   Recommend  Subsequent OCT and VF at the  for glaucoma   Update prescription   Artificial tears  As needed     Almaz Addison OD

## 2019-11-04 ENCOUNTER — HEALTH MAINTENANCE LETTER (OUTPATIENT)
Age: 44
End: 2019-11-04

## 2020-02-16 ENCOUNTER — HEALTH MAINTENANCE LETTER (OUTPATIENT)
Age: 45
End: 2020-02-16

## 2020-11-05 ENCOUNTER — TRANSFERRED RECORDS (OUTPATIENT)
Dept: MULTI SPECIALTY CLINIC | Facility: CLINIC | Age: 45
End: 2020-11-05

## 2020-11-05 LAB — PAP SMEAR - HIM PATIENT REPORTED: NEGATIVE

## 2020-11-22 ENCOUNTER — HEALTH MAINTENANCE LETTER (OUTPATIENT)
Age: 45
End: 2020-11-22

## 2021-02-07 ENCOUNTER — HEALTH MAINTENANCE LETTER (OUTPATIENT)
Age: 46
End: 2021-02-07

## 2021-06-23 ASSESSMENT — ENCOUNTER SYMPTOMS
COUGH: 0
NERVOUS/ANXIOUS: 0
CONSTIPATION: 0
HEMATURIA: 0
WEAKNESS: 0
FEVER: 0
CHILLS: 0
SORE THROAT: 0
ABDOMINAL PAIN: 0
EYE PAIN: 0
FREQUENCY: 0
ARTHRALGIAS: 0
DYSURIA: 0
MYALGIAS: 0
BREAST MASS: 0
NAUSEA: 0
HEADACHES: 0
HEARTBURN: 0
PARESTHESIAS: 0
SHORTNESS OF BREATH: 0
JOINT SWELLING: 0
DIZZINESS: 0
HEMATOCHEZIA: 0
DIARRHEA: 0
PALPITATIONS: 0

## 2021-06-30 ENCOUNTER — OFFICE VISIT (OUTPATIENT)
Dept: PEDIATRICS | Facility: CLINIC | Age: 46
End: 2021-06-30
Payer: COMMERCIAL

## 2021-06-30 VITALS
HEIGHT: 62 IN | HEART RATE: 71 BPM | TEMPERATURE: 97.5 F | WEIGHT: 152.7 LBS | OXYGEN SATURATION: 98 % | SYSTOLIC BLOOD PRESSURE: 110 MMHG | BODY MASS INDEX: 28.1 KG/M2 | DIASTOLIC BLOOD PRESSURE: 72 MMHG

## 2021-06-30 DIAGNOSIS — Z63.0 MARITAL STRESS: ICD-10-CM

## 2021-06-30 DIAGNOSIS — Z00.00 ROUTINE GENERAL MEDICAL EXAMINATION AT A HEALTH CARE FACILITY: Primary | ICD-10-CM

## 2021-06-30 DIAGNOSIS — Z12.31 ENCOUNTER FOR SCREENING MAMMOGRAM FOR BREAST CANCER: ICD-10-CM

## 2021-06-30 PROCEDURE — 99386 PREV VISIT NEW AGE 40-64: CPT | Performed by: INTERNAL MEDICINE

## 2021-06-30 PROCEDURE — 99213 OFFICE O/P EST LOW 20 MIN: CPT | Mod: 25 | Performed by: INTERNAL MEDICINE

## 2021-06-30 SDOH — SOCIAL STABILITY - SOCIAL INSECURITY: PROBLEMS IN RELATIONSHIP WITH SPOUSE OR PARTNER: Z63.0

## 2021-06-30 ASSESSMENT — ENCOUNTER SYMPTOMS
DYSURIA: 0
HEMATURIA: 0
PARESTHESIAS: 0
FREQUENCY: 0
SORE THROAT: 0
WEAKNESS: 0
HEARTBURN: 0
SHORTNESS OF BREATH: 0
JOINT SWELLING: 0
HEMATOCHEZIA: 0
CHILLS: 0
ARTHRALGIAS: 0
FEVER: 0
DIZZINESS: 0
BREAST MASS: 0
ABDOMINAL PAIN: 0
COUGH: 0
EYE PAIN: 0
HEADACHES: 0
PALPITATIONS: 0
NERVOUS/ANXIOUS: 0
DIARRHEA: 0
CONSTIPATION: 0
NAUSEA: 0
MYALGIAS: 0

## 2021-06-30 ASSESSMENT — MIFFLIN-ST. JEOR: SCORE: 1287.27

## 2021-06-30 NOTE — PROGRESS NOTES
SUBJECTIVE:   CC: Becca Garcia is an 46 year old woman who presents for preventive health visit.       Patient has been advised of split billing requirements and indicates understanding: Yes  Healthy Habits:     Getting at least 3 servings of Calcium per day:  Yes    Bi-annual eye exam:  Yes    Dental care twice a year:  Yes    Sleep apnea or symptoms of sleep apnea:  None    Diet:  Regular (no restrictions)    Frequency of exercise:  4-5 days/week    Duration of exercise:  45-60 minutes    Taking medications regularly:  Yes    Medication side effects:  Not applicable    PHQ-2 Total Score: 2    Additional concerns today:  No    PAP DONE [facility]ROUGH [facility] 11/05/2020- NORMAL    Last mammogram at age 40.     Has been going through quite a bit over the past year to year and a half. She and her  are living together, but on the path to separation. She has a lot of stress and sadness around this, as well as worry about her 3 children. Hasn't yet pursued separation, but knows she needs to. Is interested in starting therapy to help give her the skills and support to manage these changes.       Today's PHQ-2 Score:   PHQ-2 ( 1999 Pfizer) 6/23/2021   Q1: Little interest or pleasure in doing things 1   Q2: Feeling down, depressed or hopeless 1   PHQ-2 Score 2   Q1: Little interest or pleasure in doing things Several days   Q2: Feeling down, depressed or hopeless Several days   PHQ-2 Score 2       Abuse: Current or Past (Physical, Sexual or Emotional) - No  Do you feel safe in your environment? Yes    Have you ever done Advance Care Planning? (For example, a Health Directive, POLST, or a discussion with a medical provider or your loved ones about your wishes): No, advance care planning information given to patient to review.  Patient declined advance care planning discussion at this time.    Social History     Tobacco Use     Smoking status: Never Smoker     Smokeless tobacco: Never Used   Substance Use  Topics     Alcohol use: Yes     Alcohol/week: 0.0 standard drinks     Comment: 5 drinks/week     If you drink alcohol do you typically have >3 drinks per day or >7 drinks per week? No    Alcohol Use 6/23/2021   Prescreen: >3 drinks/day or >7 drinks/week? No   Prescreen: >3 drinks/day or >7 drinks/week? -       Reviewed orders with patient.  Reviewed health maintenance and updated orders accordingly - Yes  Patient Active Problem List   Diagnosis     Nonallopathic lesion of thoracic region     Disorder of bursae and tendons in shoulder region     Past Surgical History:   Procedure Laterality Date     cryotherapy      for cervical dysplasia     ENHANCE LASER REFRACTIVE BILATERAL EXISTING PT IN PARAMETERS       EYE SURGERY  2005    Lasik       Social History     Tobacco Use     Smoking status: Never Smoker     Smokeless tobacco: Never Used   Substance Use Topics     Alcohol use: Yes     Alcohol/week: 0.0 standard drinks     Comment: 5 drinks/week     Family History   Problem Relation Age of Onset     Other Cancer Father         Hairy Cell Leukemia     Heart Disease Father         MI s/p stent     Breast Cancer Paternal Aunt         2 paternal aunts.      Macular Degeneration No family hx of      Glaucoma No family hx of            Breast Cancer Screening:    FHS-7:   Breast CA Risk Assessment (FHS-7) 6/23/2021   Did any of your first-degree relatives have breast or ovarian cancer? No   Did any of your relatives have bilateral breast cancer? Yes   Did any man in your family have breast cancer? No   Did any woman in your family have breast and ovarian cancer? Yes   Did any woman in your family have breast cancer before age 50 y? No   Do you have 2 or more relatives with breast and/or ovarian cancer? Yes   Do you have 2 or more relatives with breast and/or bowel cancer? Yes       Mammogram Screening: Recommended annual mammography  Pertinent mammograms are reviewed under the imaging tab.    History of abnormal Pap smear:  "NO - age 30-65 PAP every 5 years with negative HPV co-testing recommended  PAP / HPV 8/21/2009   PAP NIL     Reviewed and updated as needed this visit by clinical staff  Tobacco  Allergies  Meds   Med Hx  Surg Hx  Fam Hx  Soc Hx        Reviewed and updated as needed this visit by Provider    Meds                 Review of Systems   Constitutional: Negative for chills and fever.   HENT: Negative for congestion, ear pain, hearing loss and sore throat.    Eyes: Negative for pain and visual disturbance.   Respiratory: Negative for cough and shortness of breath.    Cardiovascular: Negative for chest pain, palpitations and peripheral edema.   Gastrointestinal: Negative for abdominal pain, constipation, diarrhea, heartburn, hematochezia and nausea.   Breasts:  Negative for tenderness, breast mass and discharge.   Genitourinary: Negative for dysuria, frequency, genital sores, hematuria, pelvic pain, urgency, vaginal bleeding and vaginal discharge.   Musculoskeletal: Negative for arthralgias, joint swelling and myalgias.   Skin: Negative for rash.   Neurological: Negative for dizziness, weakness, headaches and paresthesias.   Psychiatric/Behavioral: Negative for mood changes. The patient is not nervous/anxious.           OBJECTIVE:   /72 (BP Location: Right arm, Patient Position: Sitting, Cuff Size: Adult Regular)   Pulse 71   Temp 97.5  F (36.4  C) (Tympanic)   Ht 1.585 m (5' 2.4\")   Wt 69.3 kg (152 lb 11.2 oz)   LMP 05/13/2021 (Exact Date)   SpO2 98%   BMI 27.57 kg/m    Physical Exam  GENERAL: healthy, alert and no distress  EYES: Eyes grossly normal to inspection, PERRL and conjunctivae and sclerae normal  HENT: ear canals and TM's normal, nose and mouth without ulcers or lesions  NECK: no adenopathy, no asymmetry, masses, or scars and thyroid normal to palpation  RESP: lungs clear to auscultation - no rales, rhonchi or wheezes  CV: regular rate and rhythm, normal S1 S2, no S3 or S4, no murmur, click " "or rub, no peripheral edema and peripheral pulses strong  ABDOMEN: soft, nontender, no hepatosplenomegaly, no masses and bowel sounds normal  MS: no gross musculoskeletal defects noted, no edema  SKIN: no suspicious lesions or rashes  NEURO: Normal strength and tone, mentation intact and speech normal  PSYCH: mentation appears normal, affect normal/bright        ASSESSMENT/PLAN:   1. Routine general medical examination at a health care facility  Counseling as below, pap and immunizations up to date. Due for mammogram.   - GLUCOSE; Future  - Lipid panel reflex to direct LDL Fasting; Future    2. Encounter for screening mammogram for breast cancer  - MA SCREENING DIGITAL BILAT - Future  (s+30); Future    3. Marital stress  Agree that counseling/therapy will likely be beneficial as she goes through these transitions. Anticipate she may have some adjustment disorder with depressed and/or anxious mood. Will ask [facility] to reach out to provide some bridge therapy while she establishes with a new therapist.   - MENTAL HEALTH REFERRAL  - Adult; Outpatient Treatment; Individual/Couples/Family/Group Therapy/Health Psychology; [facility]: [facility] [phone]; We will contact you to schedule the appointment or please call with any questions    COUNSELING:  Reviewed preventive health counseling, as reflected in patient instructions       Regular exercise       Healthy diet/nutrition       ([place])menopause management    Estimated body mass index is 27.57 kg/m  as calculated from the following:    Height as of this encounter: 1.585 m (5' 2.4\").    Weight as of this encounter: 69.3 kg (152 lb 11.2 oz).    Weight management plan: Discussed healthy diet and exercise guidelines    She reports that she has never smoked. She has never used smokeless tobacco.      Counseling Resources:  ATP IV Guidelines  Pooled Cohorts Equation Calculator  Breast Cancer Risk Calculator  BRCA-Related Cancer Risk Assessment: FHS-7 Tool  FRAX " Risk Assessment  ICSI Preventive Guidelines  Dietary Guidelines for Americans, 2010  USDA's MyPlate  ASA Prophylaxis  Lung CA Screening    Jennifer Li MD  [facility]

## 2021-06-30 NOTE — Clinical Note
[place]  Becca is in the process of considering/getting ready to separate from her  and has been dealing with a lot of feelings and stress regarding this. Is interested in therapy, but given prolonged wait times was hoping to have a couple of sessions with you before she's scheduled with [facility]. Thanks! -Jennifer

## 2021-06-30 NOTE — PATIENT INSTRUCTIONS
You should be called by central scheduling to set up a therapy appointment.     Jasmyn Sierra will also reach out to do stop-gap therapy.     Schedule mammogram and fasting labs at your convenience.     Preventive Health Recommendations  Female Ages 40 to 49    Yearly exam:     See your health care provider every year in order to  1. Review health changes.   2. Discuss preventive care.    3. Review your medicines if your doctor prescribed any.      Get a Pap test every three years (unless you have an abnormal result and your provider advises testing more often).      If you get Pap tests with HPV test, you only need to test every 5 years, unless you have an abnormal result. You do not need a Pap test if your uterus was removed (hysterectomy) and you have not had cancer.      You should be tested each year for STDs (sexually transmitted diseases), if you're at risk.     Ask your doctor if you should have a mammogram.      Have a colonoscopy (test for colon cancer) if someone in your family has had colon cancer or polyps before age 50.       Have a cholesterol test every 5 years.       Have a diabetes test (fasting glucose) after age 45. If you are at risk for diabetes, you should have this test every 3 years.    Shots: Get a flu shot each year. Get a tetanus shot every 10 years.     Nutrition:     Eat at least 5 servings of fruits and vegetables each day.    Eat whole-grain bread, whole-wheat pasta and brown rice instead of white grains and rice.    Get adequate Calcium and Vitamin D.      Lifestyle    Exercise at least 150 minutes a week (an average of 30 minutes a day, 5 days a week). This will help you control your weight and prevent disease.    Limit alcohol to one drink per day.    No smoking.     Wear sunscreen to prevent skin cancer.    See your dentist every six months for an exam and cleaning.

## 2021-07-05 ENCOUNTER — TELEPHONE (OUTPATIENT)
Dept: BEHAVIORAL HEALTH | Facility: CLINIC | Age: 46
End: 2021-07-05

## 2021-07-05 NOTE — TELEPHONE ENCOUNTER
Contacted patient due to referral by their PCP for potential [facility] services. Patient reports stress related to marital and family issues (3 teenagers). Patient's  had a heart attack in March 2020 and has been home since, adding to worries. [facility] introduced herself and her role within the clinic. Patient is in agreement with meeting and an appointment was scheduled for 07/14/2021 at 8:30 am. Patient denies any other needs or safety concerns at this time and was provided with contact information for this [facility].    [facility]

## 2021-07-21 ENCOUNTER — ANCILLARY PROCEDURE (OUTPATIENT)
Dept: MAMMOGRAPHY | Facility: CLINIC | Age: 46
End: 2021-07-21
Attending: INTERNAL MEDICINE
Payer: COMMERCIAL

## 2021-07-21 DIAGNOSIS — Z12.31 ENCOUNTER FOR SCREENING MAMMOGRAM FOR BREAST CANCER: ICD-10-CM

## 2021-07-21 PROCEDURE — 77067 SCR MAMMO BI INCL CAD: CPT | Mod: TC | Performed by: RADIOLOGY

## 2021-09-18 ENCOUNTER — HEALTH MAINTENANCE LETTER (OUTPATIENT)
Age: 46
End: 2021-09-18

## 2022-08-20 ENCOUNTER — HEALTH MAINTENANCE LETTER (OUTPATIENT)
Age: 47
End: 2022-08-20

## 2022-10-18 ENCOUNTER — TRANSFERRED RECORDS (OUTPATIENT)
Dept: MULTI SPECIALTY CLINIC | Facility: CLINIC | Age: 47
End: 2022-10-18

## 2022-10-18 LAB — PAP SMEAR - HIM PATIENT REPORTED: NORMAL

## 2022-10-27 ENCOUNTER — HOSPITAL ENCOUNTER (OUTPATIENT)
Dept: MAMMOGRAPHY | Facility: CLINIC | Age: 47
Discharge: HOME | End: 2022-10-27
Attending: INTERNAL MEDICINE | Admitting: INTERNAL MEDICINE
Payer: COMMERCIAL

## 2022-10-27 DIAGNOSIS — Z12.31 VISIT FOR SCREENING MAMMOGRAM: ICD-10-CM

## 2022-10-27 PROCEDURE — 77067 SCR MAMMO BI INCL CAD: CPT

## 2023-01-26 ENCOUNTER — OFFICE VISIT (OUTPATIENT)
Dept: PEDIATRICS | Facility: CLINIC | Age: 48
End: 2023-01-26
Payer: COMMERCIAL

## 2023-01-26 VITALS
DIASTOLIC BLOOD PRESSURE: 76 MMHG | SYSTOLIC BLOOD PRESSURE: 104 MMHG | WEIGHT: 135.9 LBS | HEART RATE: 58 BPM | OXYGEN SATURATION: 100 % | BODY MASS INDEX: 25.01 KG/M2 | HEIGHT: 62 IN | RESPIRATION RATE: 18 BRPM | TEMPERATURE: 97.1 F

## 2023-01-26 DIAGNOSIS — Z13.220 SCREENING FOR HYPERLIPIDEMIA: ICD-10-CM

## 2023-01-26 DIAGNOSIS — L65.9 HAIR LOSS: ICD-10-CM

## 2023-01-26 DIAGNOSIS — Z12.11 SCREEN FOR COLON CANCER: ICD-10-CM

## 2023-01-26 DIAGNOSIS — Z00.00 ROUTINE GENERAL MEDICAL EXAMINATION AT A HEALTH CARE FACILITY: Primary | ICD-10-CM

## 2023-01-26 LAB
CHOLEST SERPL-MCNC: 180 MG/DL
FERRITIN SERPL-MCNC: 29 NG/ML (ref 6–175)
HDLC SERPL-MCNC: 53 MG/DL
LDLC SERPL CALC-MCNC: 103 MG/DL
NONHDLC SERPL-MCNC: 127 MG/DL
TRIGL SERPL-MCNC: 120 MG/DL
TSH SERPL DL<=0.005 MIU/L-ACNC: 1.05 UIU/ML (ref 0.3–4.2)

## 2023-01-26 PROCEDURE — 80061 LIPID PANEL: CPT | Performed by: INTERNAL MEDICINE

## 2023-01-26 PROCEDURE — 84443 ASSAY THYROID STIM HORMONE: CPT | Performed by: INTERNAL MEDICINE

## 2023-01-26 PROCEDURE — 90715 TDAP VACCINE 7 YRS/> IM: CPT | Performed by: INTERNAL MEDICINE

## 2023-01-26 PROCEDURE — 99396 PREV VISIT EST AGE 40-64: CPT | Mod: 25 | Performed by: INTERNAL MEDICINE

## 2023-01-26 PROCEDURE — 36415 COLL VENOUS BLD VENIPUNCTURE: CPT | Performed by: INTERNAL MEDICINE

## 2023-01-26 PROCEDURE — 82306 VITAMIN D 25 HYDROXY: CPT | Performed by: INTERNAL MEDICINE

## 2023-01-26 PROCEDURE — 82728 ASSAY OF FERRITIN: CPT | Performed by: INTERNAL MEDICINE

## 2023-01-26 PROCEDURE — 90471 IMMUNIZATION ADMIN: CPT | Performed by: INTERNAL MEDICINE

## 2023-01-26 ASSESSMENT — PAIN SCALES - GENERAL: PAINLEVEL: NO PAIN (0)

## 2023-01-26 NOTE — PROGRESS NOTES
SUBJECTIVE:   CC: Becca is an 47 year old who presents for preventive health visit.   Patient has been advised of split billing requirements and indicates understanding: Yes  History of Present Illness       Reason for visit:  Yearly    She eats 2-3 servings of fruits and vegetables daily.She consumes 0 sweetened beverage(s) daily.She exercises with enough effort to increase her heart rate 30 to 60 minutes per day.  She exercises with enough effort to increase her heart rate 6 days per week.   She is not taking prescribed medications regularly due to Not applicable.  Healthy Habits:     Getting at least 3 servings of Calcium per day:  Yes    Bi-annual eye exam:  Yes    Dental care twice a year:  Yes    Sleep apnea or symptoms of sleep apnea:  None    Diet:  Regular (no restrictions)    Frequency of exercise:  6-7 days/week    Duration of exercise:  Greater than 60 minutes    Taking medications regularly:  No    Barriers to taking medications:  Not applicable    Medication side effects:  Not applicable    PHQ-2 Total Score: 0    Additional concerns today:  Yes    Has been noticing more hair loss recently. Has been gradually ongoing for years, but more obvious now.         Today's PHQ-2 Score:   PHQ-2 ( 1999 Pfizer) 1/26/2023   Q1: Little interest or pleasure in doing things 0   Q2: Feeling down, depressed or hopeless 0   PHQ-2 Score 0   PHQ-2 Total Score (12-17 Years)- Positive if 3 or more points; Administer PHQ-A if positive -   Q1: Little interest or pleasure in doing things Not at all   Q2: Feeling down, depressed or hopeless Not at all   PHQ-2 Score 0           Social History     Tobacco Use     Smoking status: Never     Smokeless tobacco: Never   Substance Use Topics     Alcohol use: Yes     Alcohol/week: 0.0 standard drinks     Comment: 5 drinks/week     If you drink alcohol do you typically have >3 drinks per day or >7 drinks per week? Yes      Alcohol Use 6/30/2021   Prescreen: >3 drinks/day or >7  drinks/week? -   Prescreen: >3 drinks/day or >7 drinks/week? No   No flowsheet data found.    Reviewed orders with patient.  Reviewed health maintenance and updated orders accordingly - Yes  Patient Active Problem List   Diagnosis     Nonallopathic lesion of thoracic region     Disorder of bursae and tendons in shoulder region     Past Surgical History:   Procedure Laterality Date     cryotherapy      for cervical dysplasia     ENHANCE LASER REFRACTIVE BILATERAL EXISTING PT IN PARAMETERS       EYE SURGERY  2005    Lasik       Social History     Tobacco Use     Smoking status: Never     Smokeless tobacco: Never   Substance Use Topics     Alcohol use: Yes     Alcohol/week: 0.0 standard drinks     Comment: 5 drinks/week     Family History   Problem Relation Age of Onset     Other Cancer Father         Hairy Cell Leukemia     Heart Disease Father         MI s/p stent     Breast Cancer Paternal Aunt         2 paternal aunts.      Macular Degeneration No family hx of      Glaucoma No family hx of            Breast Cancer Screening:    FHS-7:   Breast CA Risk Assessment (FHS-7) 6/23/2021 7/21/2021 7/21/2021 7/21/2021 10/27/2022   Did any of your first-degree relatives have breast or ovarian cancer? No No - No No   Did any of your relatives have bilateral breast cancer? Yes No - No No   Did any man in your family have breast cancer? No No No No No   Did any woman in your family have breast and ovarian cancer? Yes No No No No   Did any woman in your family have breast cancer before age 50 y? No No No No No   Do you have 2 or more relatives with breast and/or ovarian cancer? Yes No Yes Yes No   Do you have 2 or more relatives with breast and/or bowel cancer? Yes No Yes Yes No     click delete button to remove this line now  Mammogram Screening: Recommended annual mammography  Pertinent mammograms are reviewed under the imaging tab.    History of abnormal Pap smear: NO - age 30-65 PAP every 5 years with negative HPV co-testing  "recommended  PAP / HPV 8/21/2009   PAP (Historical) NIL     Reviewed and updated as needed this visit by clinical staff   Tobacco  Allergies  Meds              Reviewed and updated as needed this visit by Provider     Meds                 Review of Systems  CONSTITUTIONAL: NEGATIVE for fever, chills, change in weight  INTEGUMENTARU/SKIN: NEGATIVE for worrisome rashes, moles or lesions  EYES: NEGATIVE for vision changes or irritation  ENT: NEGATIVE for ear, mouth and throat problems  RESP: NEGATIVE for significant cough or SOB  BREAST: NEGATIVE for masses, tenderness or discharge  CV: NEGATIVE for chest pain, palpitations or peripheral edema  GI: NEGATIVE for nausea, abdominal pain, heartburn, or change in bowel habits  : NEGATIVE for unusual urinary or vaginal symptoms. Periods are regular.  MUSCULOSKELETAL: NEGATIVE for significant arthralgias or myalgia  NEURO: NEGATIVE for weakness, dizziness or paresthesias  PSYCHIATRIC: NEGATIVE for changes in mood or affect     OBJECTIVE:   /76 (BP Location: Right arm, Patient Position: Sitting, Cuff Size: Adult Regular)   Pulse 58   Temp 97.1  F (36.2  C) (Temporal)   Resp 18   Ht 1.585 m (5' 2.4\")   Wt 61.6 kg (135 lb 14.4 oz)   LMP 01/20/2023 (Exact Date)   SpO2 100%   BMI 24.54 kg/m    Physical Exam  GENERAL: healthy, alert and no distress  EYES: Eyes grossly normal to inspection, PERRL and conjunctivae and sclerae normal  HENT: ear canals and TM's normal, nose and mouth without ulcers or lesions  NECK: no adenopathy, no asymmetry, masses, or scars and thyroid normal to palpation  RESP: lungs clear to auscultation - no rales, rhonchi or wheezes  CV: regular rate and rhythm, normal S1 S2, no S3 or S4, no murmur, click or rub, no peripheral edema and peripheral pulses strong  ABDOMEN: soft, nontender, no hepatosplenomegaly, no masses and bowel sounds normal  MS: no gross musculoskeletal defects noted, no edema  SKIN: no suspicious lesions or " rashes  NEURO: Normal strength and tone, mentation intact and speech normal  PSYCH: mentation appears normal, affect normal/bright      ASSESSMENT/PLAN:   1. Routine general medical examination at a health care facility  Counseling as below.     2. Screen for colon cancer  - Colonoscopy Screening  Referral; Future    3. Screening for hyperlipidemia  - Lipid panel reflex to direct LDL Non-fasting    4. Hair loss  Will screen with labs, discussed topical minoxidil and follow-up with Derm if not improving.   - TSH with free T4 reflex  - Vitamin D Deficiency  - Ferritin          COUNSELING:  Reviewed preventive health counseling, as reflected in patient instructions       Regular exercise       Healthy diet/nutrition       Contraception       Colorectal Cancer Screening        She reports that she has never smoked. She has never used smokeless tobacco.          Jennifer Li MD  [facility]

## 2023-01-26 NOTE — PROGRESS NOTES
{PROVIDER CHARTING PREFERENCE:588389}    Subjective   Becca is a 47 year old{ACCOMPANIED BY STATEMENT (Optional):981293}, presenting for the following health issues:  No chief complaint on file.      History of Present Illness       Reason for visit:  Yearly    She eats 2-3 servings of fruits and vegetables daily.She consumes 0 sweetened beverage(s) daily.She exercises with enough effort to increase her heart rate 30 to 60 minutes per day.  She exercises with enough effort to increase her heart rate 6 days per week.   She is taking medications regularly.       {SUPERLIST (Optional):235275}  {additonal problems for provider to add (Optional):499404}    Review of Systems   {ROS COMP (Optional):936425}      Objective    There were no vitals taken for this visit.  There is no height or weight on file to calculate BMI.  Physical Exam   {Exam List (Optional):932121}    {Diagnostic Test Results (Optional):972558}    {AMBULATORY ATTESTATION (Optional):487088}

## 2023-01-26 NOTE — PATIENT INSTRUCTIONS
[facility]: [phone] for colonoscopy    Labs for hair loss. Ok to try topical Rogaine/minoxidil. If not improving dermatology would be next step.

## 2023-01-27 LAB — DEPRECATED CALCIDIOL+CALCIFEROL SERPL-MC: 29 UG/L (ref 20–75)

## 2023-04-20 ENCOUNTER — TRANSFERRED RECORDS (OUTPATIENT)
Dept: HEALTH INFORMATION MANAGEMENT | Facility: CLINIC | Age: 48
End: 2023-04-20
Payer: COMMERCIAL

## 2023-07-07 ENCOUNTER — LAB REQUISITION (OUTPATIENT)
Dept: LAB | Facility: CLINIC | Age: 48
End: 2023-07-07
Payer: COMMERCIAL

## 2023-07-07 ENCOUNTER — TRANSFERRED RECORDS (OUTPATIENT)
Dept: HEALTH INFORMATION MANAGEMENT | Facility: CLINIC | Age: 48
End: 2023-07-07

## 2023-07-07 DIAGNOSIS — Z11.3 ENCOUNTER FOR SCREENING FOR INFECTIONS WITH A PREDOMINANTLY SEXUAL MODE OF TRANSMISSION: ICD-10-CM

## 2023-07-07 LAB
HBV SURFACE AG SERPL QL IA: NONREACTIVE
HCV AB SERPL QL IA: NONREACTIVE
T PALLIDUM AB SER QL: NONREACTIVE

## 2023-07-07 PROCEDURE — 86780 TREPONEMA PALLIDUM: CPT | Mod: ORL | Performed by: NURSE PRACTITIONER

## 2023-07-07 PROCEDURE — 87591 N.GONORRHOEAE DNA AMP PROB: CPT | Mod: ORL | Performed by: NURSE PRACTITIONER

## 2023-07-07 PROCEDURE — 87389 HIV-1 AG W/HIV-1&-2 AB AG IA: CPT | Mod: ORL | Performed by: NURSE PRACTITIONER

## 2023-07-07 PROCEDURE — 87491 CHLMYD TRACH DNA AMP PROBE: CPT | Mod: ORL | Performed by: NURSE PRACTITIONER

## 2023-07-07 PROCEDURE — 87340 HEPATITIS B SURFACE AG IA: CPT | Mod: ORL | Performed by: NURSE PRACTITIONER

## 2023-07-07 PROCEDURE — 86803 HEPATITIS C AB TEST: CPT | Mod: ORL | Performed by: NURSE PRACTITIONER

## 2023-07-08 LAB
C TRACH DNA SPEC QL PROBE+SIG AMP: NEGATIVE
HIV 1+2 AB+HIV1 P24 AG SERPL QL IA: NONREACTIVE
N GONORRHOEA DNA SPEC QL NAA+PROBE: NEGATIVE

## 2023-09-27 ENCOUNTER — PATIENT OUTREACH (OUTPATIENT)
Dept: CARE COORDINATION | Facility: CLINIC | Age: 48
End: 2023-09-27
Payer: COMMERCIAL

## 2023-11-02 ENCOUNTER — ANCILLARY PROCEDURE (OUTPATIENT)
Dept: MAMMOGRAPHY | Facility: CLINIC | Age: 48
End: 2023-11-02
Attending: INTERNAL MEDICINE
Payer: COMMERCIAL

## 2023-11-02 DIAGNOSIS — Z12.31 VISIT FOR SCREENING MAMMOGRAM: ICD-10-CM

## 2023-11-02 PROCEDURE — 77067 SCR MAMMO BI INCL CAD: CPT | Mod: TC | Performed by: RADIOLOGY

## 2024-01-04 ENCOUNTER — PATIENT OUTREACH (OUTPATIENT)
Dept: CARE COORDINATION | Facility: CLINIC | Age: 49
End: 2024-01-04
Payer: COMMERCIAL

## 2024-01-18 ENCOUNTER — PATIENT OUTREACH (OUTPATIENT)
Dept: CARE COORDINATION | Facility: CLINIC | Age: 49
End: 2024-01-18
Payer: COMMERCIAL

## 2024-04-13 ENCOUNTER — HEALTH MAINTENANCE LETTER (OUTPATIENT)
Age: 49
End: 2024-04-13

## 2024-04-19 ENCOUNTER — OFFICE VISIT (OUTPATIENT)
Dept: PEDIATRICS | Facility: CLINIC | Age: 49
End: 2024-04-19
Payer: COMMERCIAL

## 2024-04-19 VITALS
SYSTOLIC BLOOD PRESSURE: 104 MMHG | BODY MASS INDEX: 25.34 KG/M2 | WEIGHT: 137.7 LBS | RESPIRATION RATE: 18 BRPM | HEART RATE: 57 BPM | DIASTOLIC BLOOD PRESSURE: 65 MMHG | TEMPERATURE: 97.4 F | HEIGHT: 62 IN | OXYGEN SATURATION: 97 %

## 2024-04-19 DIAGNOSIS — Z00.00 ROUTINE GENERAL MEDICAL EXAMINATION AT A HEALTH CARE FACILITY: Primary | ICD-10-CM

## 2024-04-19 PROBLEM — R87.619 ABNORMAL PAP SMEAR OF CERVIX: Status: ACTIVE | Noted: 2024-04-19

## 2024-04-19 PROCEDURE — 99396 PREV VISIT EST AGE 40-64: CPT | Performed by: STUDENT IN AN ORGANIZED HEALTH CARE EDUCATION/TRAINING PROGRAM

## 2024-04-19 RX ORDER — LEVONORGESTREL AND ETHINYL ESTRADIOL 0.15-0.03
1 KIT ORAL
COMMUNITY
Start: 2024-03-07

## 2024-04-19 RX ORDER — GLUCOSAMINE/D3/BOSWELLIA SERRA 1500MG-400
TABLET ORAL
COMMUNITY

## 2024-04-19 SDOH — HEALTH STABILITY: PHYSICAL HEALTH: ON AVERAGE, HOW MANY DAYS PER WEEK DO YOU ENGAGE IN MODERATE TO STRENUOUS EXERCISE (LIKE A BRISK WALK)?: 5 DAYS

## 2024-04-19 SDOH — HEALTH STABILITY: PHYSICAL HEALTH: ON AVERAGE, HOW MANY MINUTES DO YOU ENGAGE IN EXERCISE AT THIS LEVEL?: 30 MIN

## 2024-04-19 ASSESSMENT — SOCIAL DETERMINANTS OF HEALTH (SDOH): HOW OFTEN DO YOU GET TOGETHER WITH FRIENDS OR RELATIVES?: THREE TIMES A WEEK

## 2024-04-19 NOTE — PATIENT INSTRUCTIONS
Preventive Care Advice   This is general advice given by our system to help you stay healthy. However, your care team may have specific advice just for you. Please talk to your care team about your preventive care needs.  Nutrition  Eat 5 or more servings of fruits and vegetables each day.  Try wheat bread, brown rice and whole grain pasta (instead of white bread, rice, and pasta).  Get enough calcium and vitamin D. Check the label on foods and aim for 100% of the RDA (recommended daily allowance).  Lifestyle  Exercise at least 150 minutes each week   (30 minutes a day, 5 days a week).  Do muscle strengthening activities 2 days a week. These help control your weight and prevent disease.  No smoking.  Wear sunscreen to prevent skin cancer.  Have a dental exam and cleaning every 6 months.  Yearly exams  See your health care team every year to talk about:  Any changes in your health.  Any medicines your care team has prescribed.  Preventive care, family planning, and ways to prevent chronic diseases.  Shots (vaccines)   HPV shots (up to age 26), if you've never had them before.  Hepatitis B shots (up to age 59), if you've never had them before.  COVID-19 shot: Get this shot when it's due.  Flu shot: Get a flu shot every year.  Tetanus shot: Get a tetanus shot every 10 years.  Pneumococcal, hepatitis A, and RSV shots: Ask your care team if you need these based on your risk.  Shingles shot (for age 50 and up).  General health tests  Diabetes screening:  Starting at age 35, Get screened for diabetes at least every 3 years.  If you are younger than age 35, ask your care team if you should be screened for diabetes.  Cholesterol test: At age 39, start having a cholesterol test every 5 years, or more often if advised.  Bone density scan (DEXA): At age 50, ask your care team if you should have this scan for osteoporosis (brittle bones).  Hepatitis C: Get tested at least once in your life.  STIs (sexually transmitted  infections)  Before age 24: Ask your care team if you should be screened for STIs.  After age 24: Get screened for STIs if you're at risk. You are at risk for STIs (including HIV) if:  You are sexually active with more than one person.  You don't use condoms every time.  You or a partner was diagnosed with a sexually transmitted infection.  If you are at risk for HIV, ask about PrEP medicine to prevent HIV.  Get tested for HIV at least once in your life, whether you are at risk for HIV or not.  Cancer screening tests  Cervical cancer screening: If you have a cervix, begin getting regular cervical cancer screening tests at age 21. Most people who have regular screenings with normal results can stop after age 65. Talk about this with your provider.  Breast cancer scan (mammogram): If you've ever had breasts, begin having regular mammograms starting at age 40. This is a scan to check for breast cancer.  Colon cancer screening: It is important to start screening for colon cancer at age 45.  Have a colonoscopy test every 10 years (or more often if you're at risk) Or, ask your provider about stool tests like a FIT test every year or Cologuard test every 3 years.  To learn more about your testing options, visit: https://www.[employer]/943051.pdf.  For help making a decision, visit: https://bit.ly/yc40671.  Prostate cancer screening test: If you have a prostate and are age 55 to 69, ask your provider if you would benefit from a yearly prostate cancer screening test.  Lung cancer screening: If you are a current or former smoker age 50 to 80, ask your care team if ongoing lung cancer screenings are right for you.  For informational purposes only. Not to replace the advice of your health care provider. Copyright   2023 [employer]. All rights reserved. Clinically reviewed by the [facility]. [employer] [id number] - REV 01/24.

## 2024-04-19 NOTE — PROGRESS NOTES
Preventive Care Visit  [facility] JOSE M Nichols MD, Internal Medicine  Apr 19, 2024      Assessment & Plan     Routine general medical examination at a health care facility  Up to date on colonoscopy, mammogram and pap- which she gets through [facility] (she believes, will have records sent).  She has no concerns       Counseling  Appropriate preventive services were discussed with this patient, including applicable screening as appropriate for fall prevention, nutrition, physical activity, Tobacco-use cessation, weight loss and cognition.  Checklist reviewing preventive services available has been given to the patient.  Reviewed patient's diet, addressing concerns and/or questions.       See Patient Instructions    Subjective   Becca is a 49 year old, presenting for the following:  Physical        4/19/2024    12:50 PM   Additional Questions   Roomed by miss   Accompanied by self         4/19/2024    12:50 PM   Patient Reported Additional Medications   Patient reports taking the following new medications no        Via the Health Maintenance questionnaire, the patient has reported the following services have been completed -Cervical Cancer Screening, this information has been sent to the abstraction team.  Health Care Directive  Patient does not have a Health Care Directive or Living Will:     HPI  Overall doing well.  Had been noticing some right sided headaches, although realized that it seemed to be related to one of the lamps at work- have not recurred since she stopped turning that light on        4/19/2024   General Health   How would you rate your overall physical health? Excellent   Feel stress (tense, anxious, or unable to sleep) Not at all         4/19/2024   Nutrition   Three or more servings of calcium each day? Yes   Diet: Regular (no restrictions)   How many servings of fruit and vegetables per day? (!) 2-3   How many sweetened beverages each day? 0-1          4/19/2024   Exercise   Days per week of moderate/strenous exercise 5 days   Average minutes spent exercising at this level 30 min         4/19/2024   Social Factors   Frequency of gathering with friends or relatives Three times a week   Worry food won't last until get money to buy more No   Food not last or not have enough money for food? No   Do you have housing?  Yes   Are you worried about losing your housing? No   Lack of transportation? No   Unable to get utilities (heat,electricity)? No         4/19/2024   Fall Risk   Fallen 2 or more times in the past year? No   Trouble with walking or balance? No          4/19/2024   Dental   Dentist two times every year? Yes         4/19/2024   TB Screening   Were you born outside of the [place]? No         Today's PHQ-2 Score:       4/19/2024    12:44 PM   PHQ-2 ( 1999 [employer])   Q1: Little interest or pleasure in doing things 0   Q2: Feeling down, depressed or hopeless 0   PHQ-2 Score 0   Q1: Little interest or pleasure in doing things Not at all   Q2: Feeling down, depressed or hopeless Not at all   PHQ-2 Score 0           4/19/2024   Substance Use   Alcohol more than 3/day or more than 7/wk No   Do you use any other substances recreationally? No     Social History     Tobacco Use    Smoking status: Never    Smokeless tobacco: Never   Vaping Use    Vaping status: Never Used   Substance Use Topics    Alcohol use: Yes     Alcohol/week: 0.0 standard drinks of alcohol     Comment: 5 drinks/week    Drug use: No           11/2/2023   LAST FHS-7 RESULTS   1st degree relative breast or ovarian cancer No   Any relative bilateral breast cancer No   Any male have breast cancer No   Any ONE woman have BOTH breast AND ovarian cancer No   Any woman with breast cancer before 50yrs No   2 or more relatives with breast AND/OR ovarian cancer No   2 or more relatives with breast AND/OR bowel cancer No        Mammogram Screening - Mammogram every 1-2 years updated in Health Maintenance based on  "mutual decision making        4/19/2024   STI Screening   New sexual partner(s) since last STI/HIV test? No     History of abnormal Pap smear: YES - updated in Problem List and Health Maintenance accordingly        8/21/2009    12:00 AM   PAP / HPV   PAP (Historical) NIL      ASCVD Risk   The 10-year ASCVD risk score (Joel GOODEN, et al., 2019) is: 0.8%    Values used to calculate the score:      Age: 49 years      Sex: Female      Is Non- : No      Diabetic: No      Tobacco smoker: No      Systolic Blood Pressure: 104 mmHg      Is BP treated: No      HDL Cholesterol: 53 mg/dL      Total Cholesterol: 180 mg/dL            4/19/2024   Contraception/Family Planning   Questions about contraception or family planning No        Reviewed and updated as needed this visit by Provider                    Past Medical History:   Diagnosis Date    Cyst of ovary      Past Surgical History:   Procedure Laterality Date    cryotherapy      for cervical dysplasia    ENHANCE LASER REFRACTIVE BILATERAL EXISTING PT IN PARAMETERS      EYE SURGERY  2005    Lasik          Objective    Exam  /65 (BP Location: Right arm, Patient Position: Sitting, Cuff Size: Adult Regular)   Pulse 57   Temp 97.4  F (36.3  C) (Tympanic)   Resp 18   Ht 1.584 m (5' 2.36\")   Wt 62.5 kg (137 lb 11.2 oz)   LMP 04/17/2024   SpO2 97%   BMI 24.89 kg/m     Estimated body mass index is 24.89 kg/m  as calculated from the following:    Height as of this encounter: 1.584 m (5' 2.36\").    Weight as of this encounter: 62.5 kg (137 lb 11.2 oz).    Physical Exam  GENERAL: alert and no distress  EYES: Eyes grossly normal to inspection, PERRL and conjunctivae and sclerae normal  HENT: ear canals and TM's normal, nose and mouth without ulcers or lesions  NECK: no adenopathy, no asymmetry, masses, or scars  RESP: lungs clear to auscultation - no rales, rhonchi or wheezes  CV: regular rate and rhythm, normal S1 S2, no S3 or S4, no " murmur, click or rub, no peripheral edema  ABDOMEN: soft, nontender, no hepatosplenomegaly, no masses and bowel sounds normal  MS: no gross musculoskeletal defects noted, no edema  SKIN: no suspicious lesions or rashes  NEURO: Normal strength and tone, mentation intact and speech normal  PSYCH: mentation appears normal, affect normal/bright        Signed Electronically by: Julia Nichols MD

## 2024-07-18 ENCOUNTER — MYC MEDICAL ADVICE (OUTPATIENT)
Dept: PEDIATRICS | Facility: CLINIC | Age: 49
End: 2024-07-18
Payer: COMMERCIAL

## 2024-07-19 ENCOUNTER — OFFICE VISIT (OUTPATIENT)
Dept: URGENT CARE | Facility: URGENT CARE | Age: 49
End: 2024-07-19
Payer: COMMERCIAL

## 2024-07-19 ENCOUNTER — NURSE TRIAGE (OUTPATIENT)
Dept: PEDIATRICS | Facility: CLINIC | Age: 49
End: 2024-07-19
Payer: COMMERCIAL

## 2024-07-19 ENCOUNTER — HOSPITAL ENCOUNTER (EMERGENCY)
Facility: CLINIC | Age: 49
Discharge: HOME | End: 2024-07-19
Attending: EMERGENCY MEDICINE | Admitting: EMERGENCY MEDICINE
Payer: COMMERCIAL

## 2024-07-19 VITALS
SYSTOLIC BLOOD PRESSURE: 128 MMHG | HEART RATE: 88 BPM | TEMPERATURE: 99.5 F | BODY MASS INDEX: 25.12 KG/M2 | OXYGEN SATURATION: 100 % | WEIGHT: 141.76 LBS | DIASTOLIC BLOOD PRESSURE: 76 MMHG | HEIGHT: 63 IN | RESPIRATION RATE: 18 BRPM

## 2024-07-19 VITALS
HEART RATE: 99 BPM | SYSTOLIC BLOOD PRESSURE: 120 MMHG | BODY MASS INDEX: 25.74 KG/M2 | DIASTOLIC BLOOD PRESSURE: 79 MMHG | OXYGEN SATURATION: 100 % | TEMPERATURE: 101.5 F | WEIGHT: 142.4 LBS

## 2024-07-19 DIAGNOSIS — L03.115 CELLULITIS OF RIGHT LOWER EXTREMITY: ICD-10-CM

## 2024-07-19 DIAGNOSIS — N28.9 RENAL INSUFFICIENCY: ICD-10-CM

## 2024-07-19 DIAGNOSIS — L03.115 CELLULITIS OF RIGHT THIGH: ICD-10-CM

## 2024-07-19 DIAGNOSIS — R50.9 FEVER IN ADULT: Primary | ICD-10-CM

## 2024-07-19 DIAGNOSIS — N17.9 AKI (ACUTE KIDNEY INJURY) (H): ICD-10-CM

## 2024-07-19 LAB
ALBUMIN SERPL-MCNC: 3.1 G/DL (ref 3.4–5)
ALBUMIN UR-MCNC: 100 MG/DL
ALP SERPL-CCNC: 66 U/L (ref 40–150)
ALT SERPL W P-5'-P-CCNC: 59 U/L (ref 0–50)
AMORPH CRY #/AREA URNS HPF: ABNORMAL /HPF
ANION GAP SERPL CALCULATED.3IONS-SCNC: 12 MMOL/L (ref 7–15)
ANION GAP SERPL CALCULATED.3IONS-SCNC: 8 MMOL/L (ref 3–14)
APPEARANCE UR: CLEAR
AST SERPL W P-5'-P-CCNC: 55 U/L (ref 0–45)
BACTERIA #/AREA URNS HPF: ABNORMAL /HPF
BILIRUB SERPL-MCNC: 0.7 MG/DL (ref 0.2–1.3)
BILIRUB UR QL STRIP: NEGATIVE
BUN SERPL-MCNC: 16 MG/DL (ref 7–30)
BUN SERPL-MCNC: 16.7 MG/DL (ref 6–20)
CALCIUM SERPL-MCNC: 8.4 MG/DL (ref 8.8–10.4)
CALCIUM SERPL-MCNC: 9.8 MG/DL (ref 8.5–10.1)
CHLORIDE BLD-SCNC: 102 MMOL/L (ref 94–109)
CHLORIDE SERPL-SCNC: 101 MMOL/L (ref 98–107)
CO2 SERPL-SCNC: 26 MMOL/L (ref 20–32)
COLOR UR AUTO: YELLOW
CREAT SERPL-MCNC: 1.33 MG/DL (ref 0.51–0.95)
CREAT SERPL-MCNC: 1.7 MG/DL (ref 0.52–1.04)
EGFRCR SERPLBLD CKD-EPI 2021: 36 ML/MIN/1.73M2
EGFRCR SERPLBLD CKD-EPI 2021: 49 ML/MIN/1.73M2
ERYTHROCYTE [DISTWIDTH] IN BLOOD BY AUTOMATED COUNT: 12.8 % (ref 10–15)
ERYTHROCYTE [SEDIMENTATION RATE] IN BLOOD BY WESTERGREN METHOD: 62 MM/HR (ref 0–30)
GLUCOSE BLD-MCNC: 106 MG/DL (ref 70–99)
GLUCOSE SERPL-MCNC: 122 MG/DL (ref 70–99)
GLUCOSE UR STRIP-MCNC: NEGATIVE MG/DL
HCO3 SERPL-SCNC: 20 MMOL/L (ref 22–29)
HCT VFR BLD AUTO: 32.8 % (ref 35–47)
HGB BLD-MCNC: 11.2 G/DL (ref 11.7–15.7)
HGB UR QL STRIP: ABNORMAL
KETONES UR STRIP-MCNC: NEGATIVE MG/DL
LACTATE SERPL-SCNC: 1.3 MMOL/L (ref 0.7–2)
LEUKOCYTE ESTERASE UR QL STRIP: ABNORMAL
MCH RBC QN AUTO: 30.9 PG (ref 26.5–33)
MCHC RBC AUTO-ENTMCNC: 34.1 G/DL (ref 31.5–36.5)
MCV RBC AUTO: 91 FL (ref 78–100)
NITRATE UR QL: NEGATIVE
PH UR STRIP: 5.5 [PH] (ref 5–7)
PLATELET # BLD AUTO: 243 10E3/UL (ref 150–450)
POTASSIUM BLD-SCNC: 3.5 MMOL/L (ref 3.4–5.3)
POTASSIUM SERPL-SCNC: 3.7 MMOL/L (ref 3.4–5.3)
PROT SERPL-MCNC: 6.8 G/DL (ref 6.8–8.8)
RBC # BLD AUTO: 3.62 10E6/UL (ref 3.8–5.2)
RBC #/AREA URNS AUTO: ABNORMAL /HPF
SODIUM SERPL-SCNC: 133 MMOL/L (ref 135–145)
SODIUM SERPL-SCNC: 136 MMOL/L (ref 135–145)
SP GR UR STRIP: 1.02 (ref 1–1.03)
SQUAMOUS #/AREA URNS AUTO: ABNORMAL /LPF
UROBILINOGEN UR STRIP-ACNC: 0.2 E.U./DL
WBC # BLD AUTO: 7.9 10E3/UL (ref 4–11)
WBC #/AREA URNS AUTO: ABNORMAL /HPF

## 2024-07-19 PROCEDURE — 81001 URINALYSIS AUTO W/SCOPE: CPT | Performed by: PHYSICIAN ASSISTANT

## 2024-07-19 PROCEDURE — 87086 URINE CULTURE/COLONY COUNT: CPT | Performed by: PHYSICIAN ASSISTANT

## 2024-07-19 PROCEDURE — 258N000003 HC RX IP 258 OP 636: Performed by: EMERGENCY MEDICINE

## 2024-07-19 PROCEDURE — 85027 COMPLETE CBC AUTOMATED: CPT | Performed by: PHYSICIAN ASSISTANT

## 2024-07-19 PROCEDURE — 96361 HYDRATE IV INFUSION ADD-ON: CPT

## 2024-07-19 PROCEDURE — 96365 THER/PROPH/DIAG IV INF INIT: CPT

## 2024-07-19 PROCEDURE — 80053 COMPREHEN METABOLIC PANEL: CPT | Performed by: PHYSICIAN ASSISTANT

## 2024-07-19 PROCEDURE — 36415 COLL VENOUS BLD VENIPUNCTURE: CPT | Performed by: EMERGENCY MEDICINE

## 2024-07-19 PROCEDURE — 85652 RBC SED RATE AUTOMATED: CPT | Performed by: PHYSICIAN ASSISTANT

## 2024-07-19 PROCEDURE — 250N000013 HC RX MED GY IP 250 OP 250 PS 637: Performed by: EMERGENCY MEDICINE

## 2024-07-19 PROCEDURE — 99214 OFFICE O/P EST MOD 30 MIN: CPT | Performed by: PHYSICIAN ASSISTANT

## 2024-07-19 PROCEDURE — 99284 EMERGENCY DEPT VISIT MOD MDM: CPT | Mod: 25

## 2024-07-19 PROCEDURE — 83605 ASSAY OF LACTIC ACID: CPT | Performed by: EMERGENCY MEDICINE

## 2024-07-19 PROCEDURE — 87040 BLOOD CULTURE FOR BACTERIA: CPT | Performed by: EMERGENCY MEDICINE

## 2024-07-19 PROCEDURE — 36415 COLL VENOUS BLD VENIPUNCTURE: CPT | Performed by: PHYSICIAN ASSISTANT

## 2024-07-19 PROCEDURE — 250N000011 HC RX IP 250 OP 636: Performed by: EMERGENCY MEDICINE

## 2024-07-19 PROCEDURE — 86618 LYME DISEASE ANTIBODY: CPT | Performed by: PHYSICIAN ASSISTANT

## 2024-07-19 RX ORDER — ACETAMINOPHEN 325 MG/1
975 TABLET ORAL ONCE
Status: COMPLETED | OUTPATIENT
Start: 2024-07-19 | End: 2024-07-19

## 2024-07-19 RX ORDER — CEPHALEXIN 500 MG/1
500 CAPSULE ORAL 4 TIMES DAILY
Qty: 40 CAPSULE | Refills: 0 | Status: SHIPPED | OUTPATIENT
Start: 2024-07-19 | End: 2024-07-29

## 2024-07-19 RX ORDER — CEFAZOLIN SODIUM 2 G/100ML
2 INJECTION, SOLUTION INTRAVENOUS ONCE
Status: COMPLETED | OUTPATIENT
Start: 2024-07-19 | End: 2024-07-19

## 2024-07-19 RX ORDER — CLOBETASOL PROPIONATE 0.5 MG/G
CREAM TOPICAL
COMMUNITY

## 2024-07-19 RX ORDER — ACETAMINOPHEN 500 MG
1000 TABLET ORAL ONCE
Status: COMPLETED | OUTPATIENT
Start: 2024-07-19 | End: 2024-07-19

## 2024-07-19 RX ADMIN — ACETAMINOPHEN 975 MG: 325 TABLET, FILM COATED ORAL at 22:17

## 2024-07-19 RX ADMIN — Medication 1000 MG: at 16:03

## 2024-07-19 RX ADMIN — SODIUM CHLORIDE 1000 ML: 9 INJECTION, SOLUTION INTRAVENOUS at 20:43

## 2024-07-19 RX ADMIN — CEFAZOLIN SODIUM 2 G: 2 INJECTION, SOLUTION INTRAVENOUS at 20:47

## 2024-07-19 ASSESSMENT — ACTIVITIES OF DAILY LIVING (ADL)
ADLS_ACUITY_SCORE: 35
ADLS_ACUITY_SCORE: 35
ADLS_ACUITY_SCORE: 33

## 2024-07-19 ASSESSMENT — COLUMBIA-SUICIDE SEVERITY RATING SCALE - C-SSRS
2. HAVE YOU ACTUALLY HAD ANY THOUGHTS OF KILLING YOURSELF IN THE PAST MONTH?: NO
6. HAVE YOU EVER DONE ANYTHING, STARTED TO DO ANYTHING, OR PREPARED TO DO ANYTHING TO END YOUR LIFE?: NO
1. IN THE PAST MONTH, HAVE YOU WISHED YOU WERE DEAD OR WISHED YOU COULD GO TO SLEEP AND NOT WAKE UP?: NO

## 2024-07-19 NOTE — PROGRESS NOTES
Assessment & Plan     1. Fever in adult  - acetaminophen (TYLENOL) tablet 1,000 mg  - UA Macroscopic with reflex to Microscopic and Culture - Clinic Collect  - CBC with platelets; Future  - Comprehensive metabolic panel; Future  - ESR: Erythrocyte sedimentation rate; Future  - LYME DISEASE TOTAL ANTIBODIES WITH REFLEX TO CONFIRMATION; Future  - CBC with platelets  - Comprehensive metabolic panel  - ESR: Erythrocyte sedimentation rate  - LYME DISEASE TOTAL ANTIBODIES WITH REFLEX TO CONFIRMATION  - UA Microscopic with Reflex to Culture  - Urine Culture    2. HORTENSIA (acute kidney injury) (H24)    3. Cellulitis of right lower extremity    Patient with suspected right upper thigh / hip cellulitis vs lyme disease. On initial exam, fever of 101.5, rigors. She is not tachycardic.   Several labs obtained today. Normal WBC, elevated sed rate to 62 and 10-25 WBC noted in urine, culture is pending.   She does not have a base creatinine, elevated to 1.7 today, concerning for an acute kidney injury. Advised ER evaluation for IV fluids, antibiotics and sepsis work up.     Aleida Guzman PA-C  [facility]    CHIEF COMPLAINT:   Chief Complaint   Patient presents with    Urgent Care     On Sunday she found a lil small peasized node in groin area, then Tuesday 2 more showed up on R side, on Weds felt fine, but woke up with a line of nodes and swelling with a fever of 1002.3 and chills,also started to have a pinkish tint, then yesterday kept hyrdrated and now the spot is bright red (bug bite)     Chay Hudson is a 49 year old female who presents to clinic today for evaluation of fever. Symptoms started with two small lymph nodes five days ago. Now, for the past 2 days, she has had fever as well has a warm, hot lesion on her right hip. Pain is made worse with palpation.   She has not had runny nose, congestion, sore throat or cough. NO vomiting or diarrhea. No dysuria, urinary frequency or hematuria.  She has not had chest pain or shortness of breath.     She does not recall any tick bite.       Past Medical History:   Diagnosis Date    Cyst of ovary      Past Surgical History:   Procedure Laterality Date    cryotherapy      for cervical dysplasia    ENHANCE LASER REFRACTIVE BILATERAL EXISTING PT IN PARAMETERS      EYE SURGERY  2005    Lasik     Social History     Tobacco Use    Smoking status: Never    Smokeless tobacco: Never   Substance Use Topics    Alcohol use: Yes     Alcohol/week: 0.0 standard drinks of alcohol     Comment: 5 drinks/week     Current Outpatient Medications   Medication Sig Dispense Refill    Biotin 57819 MCG TABS       clobetasol propionate (TEMOVATE) 0.05 % external cream clobetasol 0.05 % topical cream      levonorgestrel-ethinyl estradiol (SEASONALE) 0.15-0.03 MG tablet Take 1 tablet by mouth daily at 2 pm      Multiple Vitamins-Minerals (WOMENS MULTIVITAMIN PO)        No current facility-administered medications for this visit.     No Known Allergies    10 point ROS of systems were all negative except for pertinent positives noted in my HPI.      Exam:   /79   Pulse 99   Temp (!) 101.5  F (38.6  C)   Wt 64.6 kg (142 lb 6.4 oz)   LMP 06/30/2024   SpO2 100%   BMI 25.74 kg/m    Constitutional: alert and no distress  Head: Normocephalic, atraumatic.  Eyes: conjunctiva clear, no drainage  ENT: TMs clear and shiny ramez, nasal mucosa pink and moist, throat without tonsillar hypertrophy or erythema  Neck: neck is supple, no cervical lymphadenopathy or nuchal rigidity  Cardiovascular: RRR  Respiratory: CTA bilaterally, no rhonchi or rales  Gastrointestinal: soft and nontender  Skin: On the right anterior hip, she has erythematous patch with swelling and warmth approximately 10 cm in diameter. TTP. She has FROM in her hip and knee.   Neurologic: Speech clear, gait normal. Moves all extremities.    Results for orders placed or performed in visit on 07/19/24   UA Macroscopic with reflex  to Microscopic and Culture - Clinic Collect     Status: Abnormal    Specimen: Urine, NOS   Result Value Ref Range    Color Urine Yellow Colorless, Straw, Light Yellow, Yellow    Appearance Urine Clear Clear    Glucose Urine Negative Negative mg/dL    Bilirubin Urine Negative Negative    Ketones Urine Negative Negative mg/dL    Specific Gravity Urine 1.020 1.003 - 1.035    Blood Urine Small (A) Negative    pH Urine 5.5 5.0 - 7.0    Protein Albumin Urine 100 (A) Negative mg/dL    Urobilinogen Urine 0.2 0.2, 1.0 E.U./dL    Nitrite Urine Negative Negative    Leukocyte Esterase Urine Trace (A) Negative   CBC with platelets     Status: Abnormal   Result Value Ref Range    WBC Count 7.9 4.0 - 11.0 10e3/uL    RBC Count 3.62 (L) 3.80 - 5.20 10e6/uL    Hemoglobin 11.2 (L) 11.7 - 15.7 g/dL    Hematocrit 32.8 (L) 35.0 - 47.0 %    MCV 91 78 - 100 fL    MCH 30.9 26.5 - 33.0 pg    MCHC 34.1 31.5 - 36.5 g/dL    RDW 12.8 10.0 - 15.0 %    Platelet Count 243 150 - 450 10e3/uL   Comprehensive metabolic panel     Status: Abnormal   Result Value Ref Range    Sodium 136 135 - 145 mmol/L    Potassium 3.5 3.4 - 5.3 mmol/L    Chloride 102 94 - 109 mmol/L    Carbon Dioxide (CO2) 26 20 - 32 mmol/L    Anion Gap 8 3 - 14 mmol/L    Urea Nitrogen 16 7 - 30 mg/dL    Creatinine 1.70 (H) 0.52 - 1.04 mg/dL    GFR Estimate 36 (L) >60 mL/min/1.73m2    Calcium 9.8 8.5 - 10.1 mg/dL    Glucose 106 (H) 70 - 99 mg/dL    Alkaline Phosphatase 66 40 - 150 U/L    AST 55 (H) 0 - 45 U/L    ALT 59 (H) 0 - 50 U/L    Protein Total 6.8 6.8 - 8.8 g/dL    Albumin 3.1 (L) 3.4 - 5.0 g/dL    Bilirubin Total 0.7 0.2 - 1.3 mg/dL   ESR: Erythrocyte sedimentation rate     Status: Abnormal   Result Value Ref Range    Erythrocyte Sedimentation Rate 62 (H) 0 - 30 mm/hr   UA Microscopic with Reflex to Culture     Status: Abnormal   Result Value Ref Range    Bacteria Urine Few (A) None Seen /HPF    RBC Urine 0-2 0-2 /HPF /HPF    WBC Urine 10-25 (A) 0-5 /HPF /HPF    Squamous  Epithelials Urine Few (A) None Seen /LPF    Amorphous Crystals Urine Few (A) None Seen /HPF

## 2024-07-19 NOTE — ED TRIAGE NOTES
Pt presents to the ED with complaint of fever since Wednesday and swollen lymph nodes in her right groin since Sunday. Pt states she also has a spot on her right leg next to the swollen lymph nodes where it appears to be a mosquito bite surrounded by redness and pain. Pt went to  and had some labs. Pt creatinine is elevated to 1.70. Pt was told to come to the ED. Pt had a dose of tylenol at 1600.

## 2024-07-19 NOTE — TELEPHONE ENCOUNTER
"S: Bug Bite  B: Acquired on Sunday. Noticed it yesterday  A: Bite was swollen and red. Patient had a fever. Has greatly improved since yesterday evening. Lymph node no longer swollen, Redness on thigh and hip area less red and is turning more pink. Patient is currently afebrile and no longer has chills.  R: Okay to monitor at home. Advised patient if area starts to become more swollen, red, pain and/or develops a fever, she needs to go to Urgent Care. Patient agreeable to plan.  Reason for Disposition   Painful insect bite    Answer Assessment - Initial Assessment Questions  1. TYPE of INSECT: \"What type of insect was it?\"       No but guessing mosquito  2. ONSET: \"When did you get bitten?\"       Sunday  3. LOCATION: \"Where is the insect bite located?\"       On hip, in middle of the redness  4. REDNESS: \"Is the area red or pink?\" If Yes, ask: \"What size is area of redness?\" (inches or cm). \"When did the redness start?\"      Was red and turning more pink. Was hot but now feels better. Not as hot. Around the size of her entire hand. Noticed redness yesterday.  5. PAIN: \"Is there any pain?\" If Yes, ask: \"How bad is it?\"  (Scale 1-10; or mild, moderate, severe)      1  6. ITCHING: \"Does it itch?\" If Yes, ask: \"How bad is the itch?\"     - MILD: doesn't interfere with normal activities    - MODERATE-SEVERE: interferes with work, school, sleep, or other activities       No  7. SWELLING: \"How big is the swelling?\" (inches, cm, or compare to coins)      3/10, Is going down  8. OTHER SYMPTOMS: \"Do you have any other symptoms?\"  (e.g., difficulty breathing, hives)      Had a fever but has resolved. Last Ibuprofen was 0400 for headache. Prior was 7 hours ago and did not have a fever  9. PREGNANCY: \"Is there any chance you are pregnant?\" \"When was your last menstrual period?\"      No    Protocols used: Insect Bite-A-[place]  Marika PETERS RN, BSN    "

## 2024-07-20 ENCOUNTER — HOSPITAL ENCOUNTER (EMERGENCY)
Facility: CLINIC | Age: 49
Discharge: HOME | End: 2024-07-20
Attending: EMERGENCY MEDICINE | Admitting: EMERGENCY MEDICINE
Payer: COMMERCIAL

## 2024-07-20 VITALS
HEIGHT: 62 IN | RESPIRATION RATE: 20 BRPM | OXYGEN SATURATION: 99 % | BODY MASS INDEX: 25.56 KG/M2 | DIASTOLIC BLOOD PRESSURE: 55 MMHG | HEART RATE: 92 BPM | WEIGHT: 138.89 LBS | SYSTOLIC BLOOD PRESSURE: 126 MMHG | TEMPERATURE: 99.9 F

## 2024-07-20 DIAGNOSIS — L03.115 CELLULITIS OF RIGHT THIGH: ICD-10-CM

## 2024-07-20 LAB
ANION GAP SERPL CALCULATED.3IONS-SCNC: 12 MMOL/L (ref 7–15)
BASOPHILS # BLD AUTO: 0 10E3/UL (ref 0–0.2)
BASOPHILS NFR BLD AUTO: 0 %
BUN SERPL-MCNC: 10.4 MG/DL (ref 6–20)
CALCIUM SERPL-MCNC: 9.1 MG/DL (ref 8.8–10.4)
CHLORIDE SERPL-SCNC: 99 MMOL/L (ref 98–107)
CREAT SERPL-MCNC: 1.15 MG/DL (ref 0.51–0.95)
EGFRCR SERPLBLD CKD-EPI 2021: 58 ML/MIN/1.73M2
EOSINOPHIL # BLD AUTO: 0 10E3/UL (ref 0–0.7)
EOSINOPHIL NFR BLD AUTO: 0 %
ERYTHROCYTE [DISTWIDTH] IN BLOOD BY AUTOMATED COUNT: 12.8 % (ref 10–15)
GLUCOSE SERPL-MCNC: 141 MG/DL (ref 70–99)
HCO3 SERPL-SCNC: 22 MMOL/L (ref 22–29)
HCT VFR BLD AUTO: 31.1 % (ref 35–47)
HGB BLD-MCNC: 10.5 G/DL (ref 11.7–15.7)
IMM GRANULOCYTES # BLD: 0.1 10E3/UL
IMM GRANULOCYTES NFR BLD: 1 %
LYMPHOCYTES # BLD AUTO: 1.2 10E3/UL (ref 0.8–5.3)
LYMPHOCYTES NFR BLD AUTO: 12 %
MCH RBC QN AUTO: 30.2 PG (ref 26.5–33)
MCHC RBC AUTO-ENTMCNC: 33.8 G/DL (ref 31.5–36.5)
MCV RBC AUTO: 89 FL (ref 78–100)
MONOCYTES # BLD AUTO: 0.2 10E3/UL (ref 0–1.3)
MONOCYTES NFR BLD AUTO: 2 %
NEUTROPHILS # BLD AUTO: 8.6 10E3/UL (ref 1.6–8.3)
NEUTROPHILS NFR BLD AUTO: 86 %
NRBC # BLD AUTO: 0 10E3/UL
NRBC BLD AUTO-RTO: 0 /100
PLATELET # BLD AUTO: 237 10E3/UL (ref 150–450)
POTASSIUM SERPL-SCNC: 3.5 MMOL/L (ref 3.4–5.3)
RBC # BLD AUTO: 3.48 10E6/UL (ref 3.8–5.2)
SARS-COV-2 RNA RESP QL NAA+PROBE: NEGATIVE
SODIUM SERPL-SCNC: 133 MMOL/L (ref 135–145)
WBC # BLD AUTO: 10.1 10E3/UL (ref 4–11)

## 2024-07-20 PROCEDURE — 82374 ASSAY BLOOD CARBON DIOXIDE: CPT | Performed by: EMERGENCY MEDICINE

## 2024-07-20 PROCEDURE — 36415 COLL VENOUS BLD VENIPUNCTURE: CPT | Performed by: EMERGENCY MEDICINE

## 2024-07-20 PROCEDURE — 87635 SARS-COV-2 COVID-19 AMP PRB: CPT | Performed by: EMERGENCY MEDICINE

## 2024-07-20 PROCEDURE — 99283 EMERGENCY DEPT VISIT LOW MDM: CPT

## 2024-07-20 PROCEDURE — 85025 COMPLETE CBC W/AUTO DIFF WBC: CPT | Performed by: EMERGENCY MEDICINE

## 2024-07-20 ASSESSMENT — COLUMBIA-SUICIDE SEVERITY RATING SCALE - C-SSRS
1. IN THE PAST MONTH, HAVE YOU WISHED YOU WERE DEAD OR WISHED YOU COULD GO TO SLEEP AND NOT WAKE UP?: NO
6. HAVE YOU EVER DONE ANYTHING, STARTED TO DO ANYTHING, OR PREPARED TO DO ANYTHING TO END YOUR LIFE?: NO
2. HAVE YOU ACTUALLY HAD ANY THOUGHTS OF KILLING YOURSELF IN THE PAST MONTH?: NO

## 2024-07-20 ASSESSMENT — ACTIVITIES OF DAILY LIVING (ADL): ADLS_ACUITY_SCORE: 35

## 2024-07-20 NOTE — ED PROVIDER NOTES
"  Emergency Department Note      History of Present Illness     Chief Complaint   Abnormal Labs    HPI   Becca Garcia is a 49 year old female who presents for a rash and abnormal lab results. Patient reports that five days ago, she noticed a pea-sized bump on her right groin that she originally thought was a mosquito bite. Two days ago, she developed a rash along her right groin, had a fever of 102  F, was shaky, and was sweating profusely. Today, her fever peaked at 101  F and she was sent here from urgent care due to elevated creatinine. Patient denies any other rash or open skin. No cough, congestion, vomiting, or diarrhea. She denies personal or family history of abscesses or staph infections. Patient is not pregnant.     Independent Historian   None    Review of External Notes   Reviewed urgent care lab results from today (7/19).     Past Medical History     Medical History and Problem List   Ovarian cyst   Nonallopathic lesion of thoracic region     Medications   The patient is not currently taking any prescribed medications    Surgical History   Cryotherapy for cervical dysplasia  Lasix     Physical Exam     Patient Vitals for the past 24 hrs:   BP Temp Temp src Pulse Resp SpO2 Height Weight   07/19/24 2125 -- 99.5  F (37.5  C) Oral -- -- -- -- --   07/19/24 1821 128/73 99.4  F (37.4  C) Temporal 107 18 100 % 1.588 m (5' 2.5\") 64.3 kg (141 lb 12.1 oz)     Physical Exam  Constitutional:       Appearance: She is well-developed.   HENT:      Right Ear: External ear normal.      Left Ear: External ear normal.      Mouth/Throat:      Mouth: Mucous membranes are moist.      Pharynx: Oropharynx is clear. No oropharyngeal exudate.   Eyes:      General: No scleral icterus.     Conjunctiva/sclera: Conjunctivae normal.      Pupils: Pupils are equal, round, and reactive to light.   Cardiovascular:      Rate and Rhythm: Normal rate and regular rhythm.      Heart sounds: Normal heart sounds. No murmur heard.     No " friction rub. No gallop.   Pulmonary:      Effort: Pulmonary effort is normal. No respiratory distress.      Breath sounds: Normal breath sounds. No stridor. No wheezing, rhonchi or rales.   Abdominal:      General: Bowel sounds are normal. There is no distension.      Palpations: Abdomen is soft. There is no mass.      Tenderness: There is no abdominal tenderness.   Musculoskeletal:         General: Normal range of motion.   Skin:     General: Skin is warm and dry.      Capillary Refill: Capillary refill takes less than 2 seconds.      Findings: Rash present.      Comments: R upper lateral thigh w large area of erythema and tenderness, without any fluctuance or any signs of abscess.  Please see picture.   Neurological:      Mental Status: She is alert.             Diagnostics     Lab Results   Labs Ordered and Resulted from Time of ED Arrival to Time of ED Departure   BASIC METABOLIC PANEL - Abnormal       Result Value    Sodium 133 (*)     Potassium 3.7      Chloride 101      Carbon Dioxide (CO2) 20 (*)     Anion Gap 12      Urea Nitrogen 16.7      Creatinine 1.33 (*)     GFR Estimate 49 (*)     Calcium 8.4 (*)     Glucose 122 (*)    LACTIC ACID WHOLE BLOOD WITH 1X REPEAT IN 2 HR WHEN >2 - Normal    Lactic Acid, Initial 1.3     BLOOD CULTURE     Imaging   No orders to display     EKG   None    Independent Interpretation   None    ED Course      Medications Administered   Medications   sodium chloride 0.9% BOLUS 1,000 mL (0 mLs Intravenous Stopped 7/19/24 2231)   ceFAZolin (ANCEF) 2 g in 100 mL D5W intermittent infusion (0 g Intravenous Stopped 7/19/24 2121)   acetaminophen (TYLENOL) tablet 975 mg (975 mg Oral $Given 7/19/24 2217)     Procedures   None      Discussion of Management   None    ED Course   ED Course as of 07/19/24 2229 Fri Jul 19, 2024 2009 I evaluated the patient, obtained history, and performed a physical exam as detailed above.    2229 I rechecked on the patient and explained the plan for  discharge. They are comfortable with this plan.      Optional/Additional Documentation  None    Medical Decision Making / Diagnosis     CMS Diagnoses: None    MIPS   None    MDM   Becca Garcia is a 49 year old female who presents from urgent care for concerns of infection.  Please see the picture above.  Patient has obvious cellulitis in the right lateral thigh.  Her urgent care and labs were reviewed.  She has an elevated creatinine 1.7.  After IV fluids, it is now trending down to 1.33.  Lactic acid is normal.  She is otherwise feeling well.  Pain is improving.  She is comfortable managing this as outpatient.  There is no evidence of concern for abscess therefore no drainage is needed.  She is given a first dose Ancef here and started on 10 days Keflex.  Return precaution provided.  Patient voiced understanding that she needs to see her doctor on Monday for recheck of her labs and reevaluation of her wound.  If symptoms worsen in the meantime, she is asked to return.    Disposition   The patient was discharged.     Diagnosis     ICD-10-CM    1. Cellulitis of right thigh  L03.115       2. Renal insufficiency  N28.9          Discharge Medications   New Prescriptions    CEPHALEXIN (KEFLEX) 500 MG CAPSULE    Take 1 capsule (500 mg) by mouth 4 times daily for 10 days     Scribe Disclosure:  Sandy COLEY, am serving as a scribe at 8:07 PM on 7/19/2024 to document services personally performed by Rachelle Diaz MD based on my observations and the provider's statements to me.        Rachelle Diaz MD  07/20/24 0146

## 2024-07-20 NOTE — DISCHARGE INSTRUCTIONS
Recheck labs with your doctor on Monday  Push fluids and rest  Next does antibiotic tomorrow morning  Tylenol 1000mg every 6 hours for fever  Motrin 400mg every 8 hours for fever  Return if feeling worse

## 2024-07-21 ENCOUNTER — TELEPHONE (OUTPATIENT)
Dept: PEDIATRICS | Facility: CLINIC | Age: 49
End: 2024-07-21
Payer: COMMERCIAL

## 2024-07-21 NOTE — TELEPHONE ENCOUNTER
Patient seen in ED over the weekend and needs to be seen on Monday (Tuesday at latest if she is doing well) for follow-up. Can we schedule with Marine or myself if I have openings?    Jennifer Han MD  Internal Medicine-Pediatrics

## 2024-07-21 NOTE — ED PROVIDER NOTES
"  Emergency Department Note      History of Present Illness     Chief Complaint   Leg Swelling and Insect Bite    HPI   Becca Garcia is a 49 year old female who presents to the ED for evaluation of leg swelling and an insect bite. The patient reports that the redness from a insect bite on her outer right thigh has started to spread outside the line that was drawn during her ED visit yesterday. She states that the swelling has decreased since, but she has been having a fever, chills, and muscle pain in the affected area. Patient has been taking Tylenol for her fever and chills.    Independent Historian   None    Review of External Notes   none    Past Medical History     Medical History and Problem List   Cyst of ovary  Nonallopathic lesion of thoracic region  Disorder of bursae and tendons in shoulder region    Medications   Keflex    Surgical History   Cryotherapy  Lasik    Physical Exam     Patient Vitals for the past 24 hrs:   BP Temp Temp src Pulse Resp SpO2 Height Weight   07/20/24 1918 126/55 99.9  F (37.7  C) Oral 92 20 99 % 1.575 m (5' 2\") 63 kg (138 lb 14.2 oz)     Physical Exam  Cardiovascular:      Rate and Rhythm: Normal rate and regular rhythm.      Pulses: Normal pulses.      Heart sounds: No murmur heard.  Pulmonary:      Effort: Pulmonary effort is normal. No respiratory distress.      Breath sounds: Normal breath sounds. No stridor. No wheezing or rhonchi.   Skin:     General: Skin is warm and dry.      Capillary Refill: Capillary refill takes less than 2 seconds.      Findings: Rash present.      Comments: Please see picture. Less induration than prior.               Diagnostics     Lab Results   Labs Ordered and Resulted from Time of ED Arrival to Time of ED Departure   BASIC METABOLIC PANEL - Abnormal       Result Value    Sodium 133 (*)     Potassium 3.5      Chloride 99      Carbon Dioxide (CO2) 22      Anion Gap 12      Urea Nitrogen 10.4      Creatinine 1.15 (*)     GFR Estimate 58 (*)     " Calcium 9.1      Glucose 141 (*)    CBC WITH PLATELETS AND DIFFERENTIAL - Abnormal    WBC Count 10.1      RBC Count 3.48 (*)     Hemoglobin 10.5 (*)     Hematocrit 31.1 (*)     MCV 89      MCH 30.2      MCHC 33.8      RDW 12.8      Platelet Count 237      % Neutrophils 86      % Lymphocytes 12      % Monocytes 2      % Eosinophils 0      % Basophils 0      % Immature Granulocytes 1      NRBCs per 100 WBC 0      Absolute Neutrophils 8.6 (*)     Absolute Lymphocytes 1.2      Absolute Monocytes 0.2      Absolute Eosinophils 0.0      Absolute Basophils 0.0      Absolute Immature Granulocytes 0.1      Absolute NRBCs 0.0     COVID-19 VIRUS (CORONAVIRUS) BY PCR - Normal    SARS CoV2 PCR Negative           Independent Interpretation   None    ED Course      Medications Administered   Medications - No data to display    Procedures   Procedures     Discussion of Management   None    ED Course   ED Course as of 07/20/24 2134   Sat Jul 20, 2024 2130 I obtained history and examined the patient as noted above.      Optional/Additional Documentation  None    Medical Decision Making / Diagnosis     CMS Diagnoses: None    MIPS       None    [facility]   Becca ROLDAN Garcia is a 49 year old female who presents with recheck on her she was seen by me yesterday and I had circled the area of infection.  This looks much less erythematous and the area induration is much less as well.  Patient also notes the pain is little less today.  There is some slight erythema that has migrated outside marker but it was not a significant amount.  Given that overall the picture is improving, we decided that we will continue with outpatient Keflex.  She is aware that her repeat labs are improving.  She is comfortable with continued oral antibiotic and follow-up with her doctor for recheck.  Return precaution provided.    Disposition   The patient was discharged.     Diagnosis     ICD-10-CM    1. Cellulitis of right thigh  L03.115                  Scribe  Disclosure:  I, JANET NOVAK, am serving as a scribe at 9:27 PM on 7/20/2024 to document services personally performed by Rachelle Diaz MD based on my observations and the provider's statements to me.      Rachelle Diaz MD  07/21/24 0249

## 2024-07-21 NOTE — ED TRIAGE NOTES
Was seen yesterday for a bug bite with swelling. [place] drawn and now the redness is spreading past the line. Pt also has been having fever, chills and muscle aches since Wednesday when the area became red. Has been taking tyenol for the fever and chills.      Triage Assessment (Adult)       Row Name 07/20/24 1920          Triage Assessment    Airway WDL WDL        Respiratory WDL    Respiratory WDL WDL        Skin Circulation/Temperature WDL    Skin Circulation/Temperature WDL X  red raised are on upper leg        Cardiac WDL    Cardiac WDL WDL        Peripheral/Neurovascular WDL    Peripheral Neurovascular WDL WDL        Cognitive/Neuro/Behavioral WDL    Cognitive/Neuro/Behavioral WDL WDL

## 2024-07-22 ENCOUNTER — DOCUMENTATION ONLY (OUTPATIENT)
Dept: LAB | Facility: CLINIC | Age: 49
End: 2024-07-22

## 2024-07-22 ENCOUNTER — LAB (OUTPATIENT)
Dept: LAB | Facility: CLINIC | Age: 49
End: 2024-07-22
Payer: COMMERCIAL

## 2024-07-22 ENCOUNTER — PATIENT OUTREACH (OUTPATIENT)
Dept: PEDIATRICS | Facility: CLINIC | Age: 49
End: 2024-07-22

## 2024-07-22 DIAGNOSIS — L03.90 CELLULITIS, UNSPECIFIED CELLULITIS SITE: Primary | ICD-10-CM

## 2024-07-22 DIAGNOSIS — L03.90 CELLULITIS, UNSPECIFIED CELLULITIS SITE: ICD-10-CM

## 2024-07-22 LAB
B BURGDOR IGG+IGM SER QL: 0.36
BACTERIA UR CULT: NO GROWTH
HOLD SPECIMEN: NORMAL
HOLD SPECIMEN: NORMAL

## 2024-07-22 PROCEDURE — 85025 COMPLETE CBC W/AUTO DIFF WBC: CPT

## 2024-07-22 PROCEDURE — 80048 BASIC METABOLIC PNL TOTAL CA: CPT

## 2024-07-22 PROCEDURE — 36415 COLL VENOUS BLD VENIPUNCTURE: CPT

## 2024-07-22 NOTE — TELEPHONE ENCOUNTER
Called pt and LVM to call back to be scheduled. Offered ayaka's appt at 11:00Am today    TONY Wong

## 2024-07-22 NOTE — TELEPHONE ENCOUNTER
Patient was seen at the [facility] on 7/19/2024 and 7/20/2024 for Cellulitis of right thigh.     cephALEXin (KEFLEX) 500 MG capsule 40 capsule 0 7/19/2024 7/29/2024 No   Sig - Route: Take 1 capsule (500 mg) by mouth 4 times daily for 10 days - Oral     Follow-Ups: Follow up with [facility] (EMERGENCY MEDICINE); If symptoms worsen     Routing to the [facility] for an RN to contact to the patient to complete the TCM Outreach.     Jeny SAMUEL RN   [facility]

## 2024-07-22 NOTE — PROGRESS NOTES
Becca ROLDAN Garcia has an upcoming lab appointment:    Future Appointments   Date Time Provider Department Center   4/24/2025  1:30 PM Jennifer Han MD [facility]     Patient came in for lab appt today for an ER follow-up. I drew a green and purple tube on her. If need be, you can add a BMP and a CBC on to those tubes. I did instruct the patient to reach out to you in order to have that follow up appt.     There is no order available. Please review and place either future orders or HMPO (Review of Health Maintenance Protocol Orders), as appropriate.    Health Maintenance Due   Topic    ANNUAL REVIEW OF HM ORDERS      Alejandra Garduno

## 2024-07-22 NOTE — RESULT ENCOUNTER NOTE
Becca  Your results were normal. Please contact me if you have any questions through my-chart     Heaven DAVIDSON-LILIA

## 2024-07-23 ENCOUNTER — OFFICE VISIT (OUTPATIENT)
Dept: PEDIATRICS | Facility: CLINIC | Age: 49
End: 2024-07-23
Payer: COMMERCIAL

## 2024-07-23 ENCOUNTER — MYC MEDICAL ADVICE (OUTPATIENT)
Dept: PEDIATRICS | Facility: CLINIC | Age: 49
End: 2024-07-23

## 2024-07-23 VITALS
WEIGHT: 141.6 LBS | DIASTOLIC BLOOD PRESSURE: 71 MMHG | SYSTOLIC BLOOD PRESSURE: 113 MMHG | OXYGEN SATURATION: 97 % | HEART RATE: 72 BPM | HEIGHT: 62 IN | TEMPERATURE: 98 F | BODY MASS INDEX: 26.06 KG/M2 | RESPIRATION RATE: 18 BRPM

## 2024-07-23 DIAGNOSIS — D64.9 ANEMIA, UNSPECIFIED TYPE: ICD-10-CM

## 2024-07-23 DIAGNOSIS — N28.9 DECREASED RENAL FUNCTION: Primary | ICD-10-CM

## 2024-07-23 DIAGNOSIS — L03.115 CELLULITIS OF RIGHT THIGH: Primary | ICD-10-CM

## 2024-07-23 DIAGNOSIS — N28.9 DECREASED RENAL FUNCTION: ICD-10-CM

## 2024-07-23 LAB
ANION GAP SERPL CALCULATED.3IONS-SCNC: 11 MMOL/L (ref 7–15)
BASOPHILS # BLD AUTO: 0 10E3/UL (ref 0–0.2)
BASOPHILS NFR BLD AUTO: 1 %
BUN SERPL-MCNC: 15.3 MG/DL (ref 6–20)
CALCIUM SERPL-MCNC: 8.7 MG/DL (ref 8.8–10.4)
CHLORIDE SERPL-SCNC: 100 MMOL/L (ref 98–107)
CREAT SERPL-MCNC: 1.15 MG/DL (ref 0.51–0.95)
EGFRCR SERPLBLD CKD-EPI 2021: 58 ML/MIN/1.73M2
EOSINOPHIL # BLD AUTO: 0.1 10E3/UL (ref 0–0.7)
EOSINOPHIL NFR BLD AUTO: 2 %
ERYTHROCYTE [DISTWIDTH] IN BLOOD BY AUTOMATED COUNT: 12.7 % (ref 10–15)
GLUCOSE SERPL-MCNC: 93 MG/DL (ref 70–99)
HCO3 SERPL-SCNC: 24 MMOL/L (ref 22–29)
HCT VFR BLD AUTO: 30.3 % (ref 35–47)
HGB BLD-MCNC: 9.9 G/DL (ref 11.7–15.7)
IMM GRANULOCYTES # BLD: 0 10E3/UL
IMM GRANULOCYTES NFR BLD: 1 %
LYMPHOCYTES # BLD AUTO: 1.8 10E3/UL (ref 0.8–5.3)
LYMPHOCYTES NFR BLD AUTO: 33 %
MCH RBC QN AUTO: 29.5 PG (ref 26.5–33)
MCHC RBC AUTO-ENTMCNC: 32.7 G/DL (ref 31.5–36.5)
MCV RBC AUTO: 90 FL (ref 78–100)
MONOCYTES # BLD AUTO: 0.4 10E3/UL (ref 0–1.3)
MONOCYTES NFR BLD AUTO: 6 %
NEUTROPHILS # BLD AUTO: 3.1 10E3/UL (ref 1.6–8.3)
NEUTROPHILS NFR BLD AUTO: 57 %
NRBC # BLD AUTO: 0 10E3/UL
NRBC BLD AUTO-RTO: 0 /100
PLATELET # BLD AUTO: 355 10E3/UL (ref 150–450)
POTASSIUM SERPL-SCNC: 3.9 MMOL/L (ref 3.4–5.3)
RBC # BLD AUTO: 3.36 10E6/UL (ref 3.8–5.2)
SODIUM SERPL-SCNC: 135 MMOL/L (ref 135–145)
WBC # BLD AUTO: 5.5 10E3/UL (ref 4–11)

## 2024-07-23 PROCEDURE — 99213 OFFICE O/P EST LOW 20 MIN: CPT | Performed by: PHYSICIAN ASSISTANT

## 2024-07-23 RX ORDER — ACETAMINOPHEN 500 MG
500-1000 TABLET ORAL 2 TIMES DAILY
COMMUNITY

## 2024-07-23 ASSESSMENT — PAIN SCALES - GENERAL: PAINLEVEL: NO PAIN (0)

## 2024-07-23 NOTE — TELEPHONE ENCOUNTER
ED / Discharge Outreach Protocol    Patient Contact    Attempt # 1    Was call answered?  No.  Left message on voicemail with information to call nurse back.      - patient did have ED follow up 7/23 with BENI Adame RN on 7/23/2024 at 3:31 PM

## 2024-07-23 NOTE — TELEPHONE ENCOUNTER
Pt called. States that a nurse has called her and returning a call.   Unable to see notes/ encounter from the nurse.   Advised pt that creatinine lab results are still pending and provider with reach out with results as needed.   Pt verbalized understanding.     Ena Montoya RN

## 2024-07-23 NOTE — PROGRESS NOTES
"  Assessment & Plan     Cellulitis of right thigh  Improving. Continue with antibiotics.     Decreased renal function  Repeat labs     Anemia, unspecified type  Repeat labs    Subjective   Becca is a 49 year old, presenting for the following health issues:  ER F/U (Cellulitis )        7/23/2024    10:15 AM   Additional Questions   Roomed by RI   Accompanied by Self         7/23/2024    10:15 AM   Patient Reported Additional Medications   Patient reports taking the following new medications None     HPI   ED/UC Followup:    Facility:  [facility]   Date of visit: 7/20/2024  Reason for visit: Cellulitis Right Thigh  Current Status: Stable- Feeling much better.   Patient seen in ED x 2. She had cellulitis of right thigh including rigors, high fevers, fatigue. In ED, patient given IV antibiotics and discharged on keflex four times daily. She took tylenol for fevers and pain relief.   Followed up one day later in ED with improvement in symptoms.     She had elevated sed rate, decreased renal function and anemia noted.     Needs repeat labs.    Review of Systems  Constitutional, HEENT, cardiovascular, pulmonary, gi and gu systems are negative, except as otherwise noted.      Objective    /71 (BP Location: Right arm, Patient Position: Sitting, Cuff Size: Adult Regular)   Pulse 72   Temp 98  F (36.7  C) (Tympanic)   Resp 18   Ht 1.575 m (5' 2\")   Wt 64.2 kg (141 lb 9.6 oz)   LMP 06/30/2024 (Exact Date)   SpO2 97%   BMI 25.90 kg/m    Body mass index is 25.9 kg/m .  Physical Exam   GENERAL: alert and no distress  LEG: mild erythema at right groin. No warmth or tenderness. Improving.    No results found for any visits on 07/23/24.        Signed Electronically by: Zach Harris PA-C    "

## 2024-07-23 NOTE — TELEPHONE ENCOUNTER
Pt sent MyC asking to be scheduled today. Scheduled for 10:30am with ayaka today 7/23  Minerva Kowalski, VF

## 2024-07-24 LAB — BACTERIA BLD CULT: NO GROWTH

## 2024-07-24 NOTE — TELEPHONE ENCOUNTER
ED / Discharge Outreach Protocol    Patient Contact    Attempt # 2    Was call answered?  No.  Left message on voicemail with information to call nurse back.    Nadia MEHTA RN on 7/24/2024 at 9:18 AM

## 2024-09-17 ENCOUNTER — LAB REQUISITION (OUTPATIENT)
Dept: LAB | Facility: CLINIC | Age: 49
End: 2024-09-17
Payer: COMMERCIAL

## 2024-09-17 ENCOUNTER — LAB REQUISITION (OUTPATIENT)
Dept: LAB | Facility: CLINIC | Age: 49
End: 2024-09-17

## 2024-09-17 DIAGNOSIS — Z12.4 ENCOUNTER FOR SCREENING FOR MALIGNANT NEOPLASM OF CERVIX: ICD-10-CM

## 2024-09-17 DIAGNOSIS — N84.1 POLYP OF CERVIX UTERI: ICD-10-CM

## 2024-09-17 PROCEDURE — 87624 HPV HI-RISK TYP POOLED RSLT: CPT | Mod: ORL | Performed by: NURSE PRACTITIONER

## 2024-09-17 PROCEDURE — 88305 TISSUE EXAM BY PATHOLOGIST: CPT | Performed by: PATHOLOGY

## 2024-09-17 PROCEDURE — G0145 SCR C/V CYTO,THINLAYER,RESCR: HCPCS | Mod: ORL | Performed by: NURSE PRACTITIONER

## 2024-09-18 LAB
HPV HR 12 DNA CVX QL NAA+PROBE: NEGATIVE
HPV16 DNA CVX QL NAA+PROBE: NEGATIVE
HPV18 DNA CVX QL NAA+PROBE: NEGATIVE
HUMAN PAPILLOMA VIRUS FINAL DIAGNOSIS: NORMAL

## 2024-09-19 LAB
PATH REPORT.COMMENTS IMP SPEC: NORMAL
PATH REPORT.COMMENTS IMP SPEC: NORMAL
PATH REPORT.FINAL DX SPEC: NORMAL
PATH REPORT.GROSS SPEC: NORMAL
PATH REPORT.MICROSCOPIC SPEC OTHER STN: NORMAL
PATH REPORT.RELEVANT HX SPEC: NORMAL
PHOTO IMAGE: NORMAL

## 2024-09-20 LAB
BKR AP ASSOCIATED HPV REPORT: NORMAL
BKR LAB AP GYN ADEQUACY: NORMAL
BKR LAB AP GYN INTERPRETATION: NORMAL
BKR LAB AP LMP: NORMAL
BKR LAB AP PREVIOUS ABNL DX: NORMAL
BKR LAB AP PREVIOUS ABNORMAL: NORMAL
PATH REPORT.COMMENTS IMP SPEC: NORMAL
PATH REPORT.COMMENTS IMP SPEC: NORMAL
PATH REPORT.RELEVANT HX SPEC: NORMAL

## 2024-10-03 ENCOUNTER — PATIENT OUTREACH (OUTPATIENT)
Dept: CARE COORDINATION | Facility: CLINIC | Age: 49
End: 2024-10-03
Payer: COMMERCIAL

## 2024-10-19 ENCOUNTER — LAB (OUTPATIENT)
Dept: LAB | Facility: CLINIC | Age: 49
End: 2024-10-19
Payer: COMMERCIAL

## 2024-10-19 DIAGNOSIS — N28.9 DECREASED RENAL FUNCTION: ICD-10-CM

## 2024-10-19 DIAGNOSIS — D64.9 ANEMIA, UNSPECIFIED TYPE: ICD-10-CM

## 2024-10-19 LAB
ERYTHROCYTE [DISTWIDTH] IN BLOOD BY AUTOMATED COUNT: 13.2 % (ref 10–15)
HCT VFR BLD AUTO: 37.7 % (ref 35–47)
HGB BLD-MCNC: 11.7 G/DL (ref 11.7–15.7)
MCH RBC QN AUTO: 27.9 PG (ref 26.5–33)
MCHC RBC AUTO-ENTMCNC: 31 G/DL (ref 31.5–36.5)
MCV RBC AUTO: 90 FL (ref 78–100)
PLATELET # BLD AUTO: 378 10E3/UL (ref 150–450)
RBC # BLD AUTO: 4.2 10E6/UL (ref 3.8–5.2)
WBC # BLD AUTO: 9.5 10E3/UL (ref 4–11)

## 2024-10-19 PROCEDURE — 80048 BASIC METABOLIC PNL TOTAL CA: CPT

## 2024-10-19 PROCEDURE — 36415 COLL VENOUS BLD VENIPUNCTURE: CPT

## 2024-10-19 PROCEDURE — 83550 IRON BINDING TEST: CPT

## 2024-10-19 PROCEDURE — 82728 ASSAY OF FERRITIN: CPT

## 2024-10-19 PROCEDURE — 83540 ASSAY OF IRON: CPT

## 2024-10-19 PROCEDURE — 85027 COMPLETE CBC AUTOMATED: CPT

## 2024-10-20 LAB
ANION GAP SERPL CALCULATED.3IONS-SCNC: 10 MMOL/L (ref 7–15)
BUN SERPL-MCNC: 17.8 MG/DL (ref 6–20)
CALCIUM SERPL-MCNC: 8.9 MG/DL (ref 8.8–10.4)
CHLORIDE SERPL-SCNC: 104 MMOL/L (ref 98–107)
CREAT SERPL-MCNC: 1.33 MG/DL (ref 0.51–0.95)
EGFRCR SERPLBLD CKD-EPI 2021: 49 ML/MIN/1.73M2
FERRITIN SERPL-MCNC: 16 NG/ML (ref 6–175)
GLUCOSE SERPL-MCNC: 90 MG/DL (ref 70–99)
HCO3 SERPL-SCNC: 22 MMOL/L (ref 22–29)
IRON BINDING CAPACITY (ROCHE): 392 UG/DL (ref 240–430)
IRON SATN MFR SERPL: 9 % (ref 15–46)
IRON SERPL-MCNC: 37 UG/DL (ref 37–145)
POTASSIUM SERPL-SCNC: 4.3 MMOL/L (ref 3.4–5.3)
SODIUM SERPL-SCNC: 136 MMOL/L (ref 135–145)

## 2024-10-29 ENCOUNTER — VIRTUAL VISIT (OUTPATIENT)
Dept: PEDIATRICS | Facility: CLINIC | Age: 49
End: 2024-10-29
Payer: COMMERCIAL

## 2024-10-29 DIAGNOSIS — N28.9 DECREASED RENAL FUNCTION: Primary | ICD-10-CM

## 2024-10-29 PROCEDURE — 99442 PR PHYSICIAN TELEPHONE EVALUATION 11-20 MIN: CPT | Mod: 93 | Performed by: STUDENT IN AN ORGANIZED HEALTH CARE EDUCATION/TRAINING PROGRAM

## 2024-10-29 NOTE — PROGRESS NOTES
"Becca is a 49 year old who is being evaluated via a billable telephone visit.      Originating Location (pt. Location): Home    Distant Location (provider location):  On-site    Assessment & Plan     Decreased renal function  Recent cellulitis and labs at the time notable for HORTENSIA, however notably has not had a creatinine checked since 2008 prior to this.  Her creatinine did improve but has not returned to the normal range.  She does not that she wasn't well hydrated on the day of her labs.  She otherwise feels well without any other symptoms and no history of hypertension.  No nephrotoxic meds.  Will plan to repeat BMP as well as obtain a UA to assess for any proteinuria or hematuria.    - Basic metabolic panel  (Ca, Cl, CO2, Creat, Gluc, K, Na, BUN); Future  - UA Macroscopic with reflex to Microscopic and Culture - Clinic Collect      No LOS data to display   Time spent by me doing chart review, history and exam, documentation and further activities per the note      BMI  Estimated body mass index is 25.9 kg/m  as calculated from the following:    Height as of 7/23/24: 1.575 m (5' 2\").    Weight as of 7/23/24: 64.2 kg (141 lb 9.6 oz).             Subjective   Becca is a 49 year old, presenting for the following health issues:  No chief complaint on file.    HPI   Developed cellulitis after a bug bite and went to  and had abnormal labs     Took about 10 days and everything had improved and has since resolved     Feels great now    Maybe wasn't as hydrated at repeat lab draw.  Was fasting that day     Was not taking any ibuprofen     No kidney disease in the family     Notes that he she basically won't pee throughout the course of the day                Objective           Vitals:  No vitals were obtained today due to virtual visit.    Physical Exam   General: Alert and no distress //Respiratory: No audible wheeze, cough, or shortness of breath // Psychiatric:  Appropriate affect, tone, and pace of " words            Phone call duration: 15 minutes  Signed Electronically by: Julia Nichols MD

## 2024-10-31 ENCOUNTER — PATIENT OUTREACH (OUTPATIENT)
Dept: CARE COORDINATION | Facility: CLINIC | Age: 49
End: 2024-10-31
Payer: COMMERCIAL

## 2024-11-07 ENCOUNTER — LAB (OUTPATIENT)
Dept: LAB | Facility: CLINIC | Age: 49
End: 2024-11-07
Payer: COMMERCIAL

## 2024-11-07 DIAGNOSIS — N28.9 DECREASED RENAL FUNCTION: ICD-10-CM

## 2024-11-07 LAB
ALBUMIN UR-MCNC: NEGATIVE MG/DL
APPEARANCE UR: CLEAR
BACTERIA #/AREA URNS HPF: ABNORMAL /HPF
BILIRUB UR QL STRIP: NEGATIVE
COLOR UR AUTO: YELLOW
GLUCOSE UR STRIP-MCNC: NEGATIVE MG/DL
HGB UR QL STRIP: ABNORMAL
KETONES UR STRIP-MCNC: NEGATIVE MG/DL
LEUKOCYTE ESTERASE UR QL STRIP: ABNORMAL
NITRATE UR QL: NEGATIVE
PH UR STRIP: 5.5 [PH] (ref 5–7)
RBC #/AREA URNS AUTO: ABNORMAL /HPF
SP GR UR STRIP: 1.01 (ref 1–1.03)
SQUAMOUS #/AREA URNS AUTO: ABNORMAL /LPF
UROBILINOGEN UR STRIP-ACNC: 0.2 E.U./DL
WBC #/AREA URNS AUTO: ABNORMAL /HPF

## 2024-11-07 PROCEDURE — 80048 BASIC METABOLIC PNL TOTAL CA: CPT

## 2024-11-07 PROCEDURE — 36415 COLL VENOUS BLD VENIPUNCTURE: CPT

## 2024-11-07 PROCEDURE — 81001 URINALYSIS AUTO W/SCOPE: CPT | Mod: 93 | Performed by: STUDENT IN AN ORGANIZED HEALTH CARE EDUCATION/TRAINING PROGRAM

## 2024-11-08 LAB
ANION GAP SERPL CALCULATED.3IONS-SCNC: 10 MMOL/L (ref 7–15)
BUN SERPL-MCNC: 12.4 MG/DL (ref 6–20)
CALCIUM SERPL-MCNC: 8.8 MG/DL (ref 8.8–10.4)
CHLORIDE SERPL-SCNC: 103 MMOL/L (ref 98–107)
CREAT SERPL-MCNC: 1.38 MG/DL (ref 0.51–0.95)
EGFRCR SERPLBLD CKD-EPI 2021: 46 ML/MIN/1.73M2
GLUCOSE SERPL-MCNC: 114 MG/DL (ref 70–99)
HCO3 SERPL-SCNC: 23 MMOL/L (ref 22–29)
POTASSIUM SERPL-SCNC: 4.1 MMOL/L (ref 3.4–5.3)
SODIUM SERPL-SCNC: 136 MMOL/L (ref 135–145)

## 2024-11-12 ENCOUNTER — MYC MEDICAL ADVICE (OUTPATIENT)
Dept: PEDIATRICS | Facility: CLINIC | Age: 49
End: 2024-11-12
Payer: COMMERCIAL

## 2024-11-12 DIAGNOSIS — N18.31 STAGE 3A CHRONIC KIDNEY DISEASE (H): Primary | ICD-10-CM

## 2024-11-12 NOTE — TELEPHONE ENCOUNTER
"Patient is agreeable to having a kidney ultrasound.    Per result note 11/12/24:  \"... at this point we could either do an ultrasound of your kidneys to try to get more information... \"      "

## 2024-11-14 ENCOUNTER — ANCILLARY PROCEDURE (OUTPATIENT)
Dept: ULTRASOUND IMAGING | Facility: CLINIC | Age: 49
End: 2024-11-14
Attending: INTERNAL MEDICINE
Payer: COMMERCIAL

## 2024-11-14 DIAGNOSIS — N18.31 STAGE 3A CHRONIC KIDNEY DISEASE (H): ICD-10-CM

## 2024-11-14 PROCEDURE — 93975 VASCULAR STUDY: CPT | Mod: GC | Performed by: RADIOLOGY

## 2024-11-14 PROCEDURE — 76770 US EXAM ABDO BACK WALL COMP: CPT | Mod: GC | Performed by: RADIOLOGY

## 2024-12-10 ENCOUNTER — ANCILLARY PROCEDURE (OUTPATIENT)
Dept: MAMMOGRAPHY | Facility: CLINIC | Age: 49
End: 2024-12-10
Attending: INTERNAL MEDICINE
Payer: COMMERCIAL

## 2024-12-10 DIAGNOSIS — Z12.31 VISIT FOR SCREENING MAMMOGRAM: ICD-10-CM

## 2024-12-10 PROCEDURE — 77067 SCR MAMMO BI INCL CAD: CPT | Mod: TC | Performed by: RADIOLOGY

## 2024-12-10 PROCEDURE — 77063 BREAST TOMOSYNTHESIS BI: CPT | Mod: TC | Performed by: RADIOLOGY

## 2025-03-25 ENCOUNTER — TELEPHONE (OUTPATIENT)
Dept: PEDIATRICS | Facility: CLINIC | Age: 50
End: 2025-03-25
Payer: COMMERCIAL

## 2025-05-08 ENCOUNTER — OFFICE VISIT (OUTPATIENT)
Dept: PEDIATRICS | Facility: CLINIC | Age: 50
End: 2025-05-08
Payer: COMMERCIAL

## 2025-05-08 VITALS
TEMPERATURE: 97.1 F | WEIGHT: 139 LBS | SYSTOLIC BLOOD PRESSURE: 112 MMHG | DIASTOLIC BLOOD PRESSURE: 68 MMHG | HEART RATE: 72 BPM | OXYGEN SATURATION: 100 % | BODY MASS INDEX: 25.58 KG/M2 | RESPIRATION RATE: 18 BRPM | HEIGHT: 62 IN

## 2025-05-08 DIAGNOSIS — Z00.00 ROUTINE GENERAL MEDICAL EXAMINATION AT A HEALTH CARE FACILITY: Primary | ICD-10-CM

## 2025-05-08 DIAGNOSIS — N18.31 CHRONIC KIDNEY DISEASE, STAGE 3A (H): ICD-10-CM

## 2025-05-08 LAB
ANION GAP SERPL CALCULATED.3IONS-SCNC: 9 MMOL/L (ref 7–15)
BUN SERPL-MCNC: 12.3 MG/DL (ref 6–20)
CALCIUM SERPL-MCNC: 9 MG/DL (ref 8.8–10.4)
CHLORIDE SERPL-SCNC: 108 MMOL/L (ref 98–107)
CREAT SERPL-MCNC: 1.25 MG/DL (ref 0.51–0.95)
CREAT UR-MCNC: 297 MG/DL
CYSTATIN C (ROCHE): 0.8 MG/L (ref 0.6–1)
EGFRCR SERPLBLD CKD-EPI 2021: 52 ML/MIN/1.73M2
GFR/BSA.PRED SERPLBLD CYS-BASED-ARV: >90 ML/MIN/1.73M2
GLUCOSE SERPL-MCNC: 100 MG/DL (ref 70–99)
HCO3 SERPL-SCNC: 23 MMOL/L (ref 22–29)
MICROALBUMIN UR-MCNC: 12.5 MG/L
MICROALBUMIN/CREAT UR: 4.21 MG/G CR (ref 0–25)
POTASSIUM SERPL-SCNC: 3.9 MMOL/L (ref 3.4–5.3)
SODIUM SERPL-SCNC: 140 MMOL/L (ref 135–145)

## 2025-05-08 SDOH — HEALTH STABILITY: PHYSICAL HEALTH: ON AVERAGE, HOW MANY DAYS PER WEEK DO YOU ENGAGE IN MODERATE TO STRENUOUS EXERCISE (LIKE A BRISK WALK)?: 4 DAYS

## 2025-05-08 SDOH — HEALTH STABILITY: PHYSICAL HEALTH: ON AVERAGE, HOW MANY MINUTES DO YOU ENGAGE IN EXERCISE AT THIS LEVEL?: 30 MIN

## 2025-05-08 ASSESSMENT — SOCIAL DETERMINANTS OF HEALTH (SDOH): HOW OFTEN DO YOU GET TOGETHER WITH FRIENDS OR RELATIVES?: TWICE A WEEK

## 2025-05-08 NOTE — PATIENT INSTRUCTIONS
If kidney labs are still off I will send you to a kidney specialist - there are other medications we would consider. Stay well hydrated and avoid NSAIDs.

## 2025-05-08 NOTE — PROGRESS NOTES
"Preventive Care Visit  [facility] JOSE M Li MD, Internal Medicine - Pediatrics  May 8, 2025      Assessment & Plan     Routine general medical examination at a health care facility  Counseling as below, pap and mammogram through OB/Gyn.     Chronic kidney disease, stage 3a (H)  Looking back, GFR ~15 yrs prior was in the upper 60s, and had recently progressed around the time of her cellulitis. While potentially a component of HORTENSIA or ATN in setting of acute infection, she wasn't managed with particularly nephrotoxic regimen. Anticipate there may be some underlying process. Recent renal US reassuring. Will repeat labs today and if still in CKD range would consider nephrology referral for further work-up and consideration of SGLT2.   - Basic metabolic panel  (Ca, Cl, CO2, Creat, Gluc, K, Na, BUN); Future  - Albumin Random Urine Quantitative with Creat Ratio; Future  - Cystatin C with GFR; Future  - Adult Nephrology  Referral; Future  - Basic metabolic panel  (Ca, Cl, CO2, Creat, Gluc, K, Na, BUN)  - Albumin Random Urine Quantitative with Creat Ratio  - Cystatin C with GFR            BMI  Estimated body mass index is 25.58 kg/m  as calculated from the following:    Height as of this encounter: 1.57 m (5' 1.81\").    Weight as of this encounter: 63 kg (139 lb).   Weight management plan: Discussed healthy diet and exercise guidelines              Chay Hudson is a 50 year old, presenting for the following:  Physical        5/8/2025     3:20 PM   Additional Questions   Roomed by WI         5/8/2025     3:20 PM   Patient Reported Additional Medications   Patient reports taking the following new medications None          HPI  Overall feeling and doing quite well. No medication issues, follows with OB for paps and mammogram. Youngest is about to go to college this year.   Advance Care Planning    Discussed advance care planning with patient; however, patient declined at this " time.        5/8/2025   General Health   How would you rate your overall physical health? Excellent   Feel stress (tense, anxious, or unable to sleep) Not at all         5/8/2025   Nutrition   Three or more servings of calcium each day? Yes   Diet: Regular (no restrictions)   How many servings of fruit and vegetables per day? (!) 2-3   How many sweetened beverages each day? 0-1         5/8/2025   Exercise   Days per week of moderate/strenous exercise 4 days   Average minutes spent exercising at this level 30 min         5/8/2025   Social Factors   Frequency of gathering with friends or relatives Twice a week   Worry food won't last until get money to buy more No   Food not last or not have enough money for food? No   Do you have housing? (Housing is defined as stable permanent housing and does not include staying outside in a car, in a tent, in an abandoned building, in an overnight shelter, or couch-surfing.) Yes   Are you worried about losing your housing? No   Lack of transportation? No   Unable to get utilities (heat,electricity)? No         5/8/2025   Fall Risk   Fallen 2 or more times in the past year? No   Trouble with walking or balance? No          5/8/2025   Dental   Dentist two times every year? Yes         Today's PHQ-2 Score:       5/8/2025     3:15 PM   PHQ-2 ( 1999 [employer])   Q1: Little interest or pleasure in doing things 0   Q2: Feeling down, depressed or hopeless 0   PHQ-2 Score 0    Q1: Little interest or pleasure in doing things Not at all   Q2: Feeling down, depressed or hopeless Not at all   PHQ-2 Score 0       Patient-reported           5/8/2025   Substance Use   Alcohol more than 3/day or more than 7/wk No   Do you use any other substances recreationally? No     Social History     Tobacco Use    Smoking status: Never     Passive exposure: Never    Smokeless tobacco: Never   Vaping Use    Vaping status: Never Used   Substance Use Topics    Alcohol use: Yes     Alcohol/week: 0.0 standard drinks  of alcohol     Comment: 5 drinks/week    Drug use: No           11/2/2023   LAST FHS-7 RESULTS   1st degree relative breast or ovarian cancer No   Any relative bilateral breast cancer No   Any male have breast cancer No   Any ONE woman have BOTH breast AND ovarian cancer No   Any woman with breast cancer before 50yrs No   2 or more relatives with breast AND/OR ovarian cancer No   2 or more relatives with breast AND/OR bowel cancer No        Mammogram Screening - Mammogram every 1-2 years updated in Health Maintenance based on mutual decision making        5/8/2025   STI Screening   New sexual partner(s) since last STI/HIV test? No     History of abnormal Pap smear: YES - reflected in Problem List and Health Maintenance accordingly        Latest Ref Rng & Units 9/17/2024     2:12 PM 8/21/2009    12:00 AM   PAP / HPV   PAP  Negative for Intraepithelial Lesion or Malignancy (NILM)     PAP (Historical)   NIL    HPV 16 DNA Negative Negative     HPV 18 DNA Negative Negative     Other HR HPV Negative Negative       ASCVD Risk   The 10-year ASCVD risk score (Joel GOODEN, et al., 2019) is: 1%    Values used to calculate the score:      Age: 50 years      Sex: Female      Is Non- : No      Diabetic: No      Tobacco smoker: No      Systolic Blood Pressure: 112 mmHg      Is BP treated: No      HDL Cholesterol: 53 mg/dL      Total Cholesterol: 180 mg/dL           Reviewed and updated as needed this visit by Provider                    Patient Active Problem List   Diagnosis    Nonallopathic lesion of thoracic region    Disorder of bursae and tendons in shoulder region    Abnormal Pap smear of cervix    Chronic kidney disease, stage 3a (H)     Past Surgical History:   Procedure Laterality Date    cryotherapy      for cervical dysplasia    ENHANCE LASER REFRACTIVE BILATERAL EXISTING PT IN PARAMETERS      EYE SURGERY  2005    Lasik       Social History     Tobacco Use    Smoking status: Never      "Passive exposure: Never    Smokeless tobacco: Never   Substance Use Topics    Alcohol use: Yes     Alcohol/week: 0.0 standard drinks of alcohol     Comment: 5 drinks/week     Family History   Problem Relation Age of Onset    No Known Problems Mother     Leukemia Father 61        Hairy Cell Leukemia    Heart Disease Father 58        MI s/p stent    Other Cancer Father     No Known Problems Sister     No Known Problems Sister     Cancer Maternal Grandmother     Prostate Cancer Maternal Grandfather     Parkinsonism Paternal Grandmother     Hyperlipidemia Paternal Grandmother     Myocardial Infarction Paternal Grandfather     Breast Cancer Paternal Aunt         2 paternal aunts.     Macular Degeneration No family hx of     Glaucoma No family hx of              Review of Systems  Constitutional, neuro, ENT, endocrine, pulmonary, cardiac, gastrointestinal, genitourinary, musculoskeletal, integument and psychiatric systems are negative, except as otherwise noted.     Objective    Exam  /68 (BP Location: Right arm, Patient Position: Sitting, Cuff Size: Adult Regular)   Pulse 72   Temp 97.1  F (36.2  C) (Temporal)   Resp 18   Ht 1.57 m (5' 1.81\")   Wt 63 kg (139 lb)   SpO2 100%   BMI 25.58 kg/m     Estimated body mass index is 25.58 kg/m  as calculated from the following:    Height as of this encounter: 1.57 m (5' 1.81\").    Weight as of this encounter: 63 kg (139 lb).    Physical Exam  GENERAL: alert and no distress  EYES: Eyes grossly normal to inspection, PERRL and conjunctivae and sclerae normal  HENT: ear canals and TM's normal, nose and mouth without ulcers or lesions  NECK: no adenopathy, no asymmetry, masses, or scars  RESP: lungs clear to auscultation - no rales, rhonchi or wheezes  CV: regular rate and rhythm, normal S1 S2, no S3 or S4, no murmur, click or rub, no peripheral edema  ABDOMEN: soft, nontender, no hepatosplenomegaly, no masses and bowel sounds normal  MS: no gross musculoskeletal defects " noted, no edema  SKIN: no suspicious lesions or rashes  NEURO: Normal strength and tone, mentation intact and speech normal  PSYCH: mentation appears normal, affect normal/bright    The longitudinal plan of care for the diagnosis(es)/condition(s) as documented were addressed during this visit. Due to the added complexity in care, I will continue to support Becca in the subsequent management and with ongoing continuity of care.    Signed Electronically by: Jennifer Li MD

## 2025-05-09 ENCOUNTER — RESULTS FOLLOW-UP (OUTPATIENT)
Dept: PEDIATRICS | Facility: CLINIC | Age: 50
End: 2025-05-09

## 2025-05-12 ENCOUNTER — PATIENT OUTREACH (OUTPATIENT)
Dept: CARE COORDINATION | Facility: CLINIC | Age: 50
End: 2025-05-12
Payer: COMMERCIAL